# Patient Record
Sex: MALE | Race: WHITE | URBAN - NONMETROPOLITAN AREA
[De-identification: names, ages, dates, MRNs, and addresses within clinical notes are randomized per-mention and may not be internally consistent; named-entity substitution may affect disease eponyms.]

---

## 2017-01-02 ENCOUNTER — HOSPITAL ENCOUNTER (EMERGENCY)
Facility: HOSPITAL | Age: 47
Discharge: HOME | End: 2017-01-02
Attending: NURSE PRACTITIONER | Admitting: NURSE PRACTITIONER
Payer: COMMERCIAL

## 2017-01-02 VITALS
TEMPERATURE: 98.2 F | RESPIRATION RATE: 18 BRPM | HEART RATE: 123 BPM | DIASTOLIC BLOOD PRESSURE: 74 MMHG | SYSTOLIC BLOOD PRESSURE: 102 MMHG

## 2017-01-02 DIAGNOSIS — S60.221A CONTUSION OF RIGHT HAND, INITIAL ENCOUNTER: ICD-10-CM

## 2017-01-02 PROCEDURE — 99213 OFFICE O/P EST LOW 20 MIN: CPT

## 2017-01-02 PROCEDURE — 73130 X-RAY EXAM OF HAND: CPT | Mod: TC,RT

## 2017-01-02 PROCEDURE — 99213 OFFICE O/P EST LOW 20 MIN: CPT | Performed by: NURSE PRACTITIONER

## 2017-01-02 NOTE — DISCHARGE INSTRUCTIONS
Elevate right hand as much as able.   Apply ice to right hand for 20 minutes every 1-2 hours. Protect skin from first bite.   Wear splint to right hand.   You can take tylenol and or ibuprofen for pain.   Follow up with PCP in 10 days for re-evaluation.   Return to urgent care and emergency department for an increase in symptoms or concerns.

## 2017-01-02 NOTE — ED PROVIDER NOTES
History     Chief Complaint   Patient presents with     Hand Pain     right hand, dropped a piece of wood on it, swollen, painful     The history is provided by the patient. No  was used.     Gm Calderón is a 46 year old male who presents with right hand pain. He was loading plywood in a trailer today with a friend. His friend let go of a piece of plywood to soon and it struck his right hand as a result approx 2 hours ago. He placed ice to his hand and took ibuprofen with moderate relief. Denies further injuries. Denies fever, chills, or night sweats. Eating and drinking well. Bowel and bladder are working well.     I have reviewed the Medications, Allergies, Past Medical and Surgical History, and Social History in the Epic system.    Review of Systems   Constitutional: Positive for activity change. Negative for fever, chills, appetite change and fatigue.   Gastrointestinal: Negative for nausea and vomiting.   Genitourinary: Negative for dysuria.   Musculoskeletal: Negative for joint swelling.        Right hand pain.    Skin: Negative for rash and wound.   Psychiatric/Behavioral: Negative.        Physical Exam   BP: 102/74 mmHg  Pulse: (!) 123 (pt reports he has a high pulse)  Temp: 98.2  F (36.8  C)  Resp: 18  Physical Exam   Constitutional: He is oriented to person, place, and time. He appears well-developed and well-nourished. No distress.   HENT:   Mouth/Throat: Oropharynx is clear and moist.   Cardiovascular: Normal rate, regular rhythm, normal heart sounds and intact distal pulses.    Pulmonary/Chest: Effort normal. No respiratory distress. He has no wheezes. He has no rales.   Abdominal: Soft.   Musculoskeletal: He exhibits edema and tenderness.   Swelling and tenderness to right dorsal side of hand along metacarpals. Right radial pulse +2. CMS and ROM intact to right hand. Flexion and extension intact to right hand.    Neurological: He is alert and oriented to person, place, and  time.   Skin: Skin is warm and dry. He is not diaphoretic.   Psychiatric: He has a normal mood and affect. His behavior is normal.   Nursing note and vitals reviewed.      ED Course   Procedures  I personally reviewed the x-rays and there is NO fracture or dislocation. Radiology review pending and nurse will notify patient if there is any change in the treatment plan.    Results for orders placed or performed during the hospital encounter of 01/02/17   XR Hand Right G/E 3 Views    Narrative    RIGHT HAND    FINDINGS:  Three views of the right hand were obtained.    No acute fracture or dislocation is seen.  There is no focal osseous  lesion.    There is mild degenerative change in a few DIP joints.  Exam Date: Jan 02, 2017 03:30:13 PM  Author: KALEE GIPSON  This report is final and signed           Assessments & Plan (with Medical Decision Making)     Discussed findings from XRAY. Splint to right hand and follow up with PCP in 10 days for re-evaluation. He will follow up sooner if there is an increase in symptoms or concerns. He verbalized understanding.     I have reviewed the nursing notes.    I have reviewed the findings, diagnosis, plan and need for follow up with the patient.  Discharged in stable condition.     New Prescriptions    No medications on file       Final diagnoses:   Contusion of right hand, initial encounter     Elevate right hand as much as able.   Apply ice to right hand for 20 minutes every 1-2 hours. Protect skin from first bite.   Wear splint to right hand.   You can take tylenol and or ibuprofen for pain.   Follow up with PCP in 10 days for re-evaluation.   Return to urgent care and emergency department for an increase in symptoms or concerns.     RYAN Agee  1/2/2017  3:43 PM  [facility]ARE CLINIC        Irma Hunt NP  01/05/17 0944

## 2017-01-02 NOTE — ED AVS SNAPSHOT
[facility]    [address] [address]    Phone:  [phone]                                       Gm Calderón   MRN: [id number]    Department:  [facility]   Date of Visit:  1/2/2017           Patient Information     Date Of Birth          1970        Your diagnoses for this visit were:     Contusion of right hand, initial encounter        You were seen by Irma Hunt NP.      Follow-up Information     Follow up with Nicole Lance NP In 10 days.    Specialty:  Family Practice    Why:  For re-evaluation.     Contact information:    [facility]  [address] [place] [address] [phone]          Follow up with [facility].    Specialty:  EMERGENCY MEDICINE    Why:  As needed, If symptoms worsen    Contact information:    [address] [address] [phone]    Additional information:    From [facility]: Take [address]. Turn left at [address]. Turn left at the first stoplight on [address]. At the first stop sign, take a right onto [address]. Take a left into the parking lot and continue through until you reach the North enterance of the building.       From [place]: Take [address]. Take the [address] ramp towards [place]. Turn left onto [address]. Take a slight right onto [address]. Turn left at the first stoplight on [address]. At the first stop sign, take a right onto [address]. Take a left into the parking lot and continue through until you reach the North enterance of the building.       From [place]: Take [address]. Take a right at [address]. At the first stop sign, take a right onto [address]. Take a left into the parking lot and continue through until you reach the North enterance of the building.         Discharge Instructions       Elevate right hand as much as able.  "  Apply ice to right hand for 20 minutes every 1-2 hours. Protect skin from first bite.   Wear splint to right hand.   You can take tylenol and or ibuprofen for pain.   Follow up with PCP in 10 days for re-evaluation.   Return to urgent care and emergency department for an increase in symptoms or concerns.       Discharge References/Attachments     HAND CONTUSION (ENGLISH)         Review of your medicines      Our records show that you are taking the medicines listed below. If these are incorrect, please call your family doctor or clinic.        Dose / Directions Last dose taken    gabapentin 300 MG capsule   Commonly known as:  NEURONTIN   Dose:  300 mg   Quantity:  90 capsule        Take 1 capsule (300 mg) by mouth 3 times daily   Refills:  3        IBUPROFEN PO   Dose:  400 mg        Take 400 mg by mouth every 6 hours as needed for moderate pain   Refills:  0                Procedures and tests performed during your visit     XR Hand Right G/E 3 Views      Orders Needing Specimen Collection     None      Pending Results     Date and Time Order Name Status Description    1/2/2017 1518 XR Hand Right G/E 3 Views In process             Thank you for choosing [facility]       Thank you for choosing [facility] for your care. Our goal is always to provide you with excellent care. Hearing back from our patients is one way we can continue to improve our services. Please take a few minutes to complete the written survey that you may receive in the mail after you visit with us. Thank you!        [employer] Information     [employer] lets you send messages to your doctor, view your test results, renew your prescriptions, schedule appointments and more. To sign up, go to [email or web address] . Click on \"Log in\" on the left side of the screen, which will take you to the Welcome page. Then click on \"Sign up Now\" on the right side of the page.     You will be asked to enter the access code listed below, as well as some personal " information. Please follow the directions to create your username and password.     Your access code is: [email or web address]  Expires: 1/3/2017 11:35 AM     Your access code will  in 90 days. If you need help or a new code, please call your [facility] or [phone].        After Visit Summary       This is your record. Keep this with you and show to your community pharmacist(s) and doctor(s) at your next visit.

## 2017-01-02 NOTE — ED AVS SNAPSHOT
[facility]    [address]    [place] [address]    Phone:  [phone]                                       Gm Calderón   MRN: [id number]    Department:  [facility]   Date of Visit:  1/2/2017           After Visit Summary Signature Page     I have received my discharge instructions, and my questions have been answered. I have discussed any challenges I see with this plan with the nurse or doctor.    ..........................................................................................................................................  Patient/Patient Representative Signature      ..........................................................................................................................................  Patient Representative Print Name and Relationship to Patient    ..................................................               ................................................  Date                                            Time    ..........................................................................................................................................  Reviewed by Signature/Title    ...................................................              ..............................................  Date                                                            Time

## 2017-01-05 ASSESSMENT — ENCOUNTER SYMPTOMS
VOMITING: 0
WOUND: 0
APPETITE CHANGE: 0
CHILLS: 0
FEVER: 0
PSYCHIATRIC NEGATIVE: 1
DYSURIA: 0
FATIGUE: 0
NAUSEA: 0
JOINT SWELLING: 0
ACTIVITY CHANGE: 1

## 2017-01-19 ENCOUNTER — HOSPITAL ENCOUNTER (EMERGENCY)
Facility: HOSPITAL | Age: 47
Discharge: HOME | End: 2017-01-19
Attending: NURSE PRACTITIONER | Admitting: NURSE PRACTITIONER
Payer: COMMERCIAL

## 2017-01-19 VITALS
DIASTOLIC BLOOD PRESSURE: 88 MMHG | TEMPERATURE: 98.3 F | OXYGEN SATURATION: 99 % | SYSTOLIC BLOOD PRESSURE: 132 MMHG | HEART RATE: 99 BPM | RESPIRATION RATE: 18 BRPM

## 2017-01-19 DIAGNOSIS — S76.211A GROIN STRAIN, RIGHT, INITIAL ENCOUNTER: ICD-10-CM

## 2017-01-19 PROCEDURE — 96372 THER/PROPH/DIAG INJ SC/IM: CPT

## 2017-01-19 PROCEDURE — 76705 ECHO EXAM OF ABDOMEN: CPT | Mod: TC

## 2017-01-19 PROCEDURE — 99213 OFFICE O/P EST LOW 20 MIN: CPT | Performed by: NURSE PRACTITIONER

## 2017-01-19 PROCEDURE — 25000125 ZZHC RX 250: Performed by: NURSE PRACTITIONER

## 2017-01-19 PROCEDURE — 99214 OFFICE O/P EST MOD 30 MIN: CPT | Mod: 25

## 2017-01-19 RX ORDER — KETOROLAC TROMETHAMINE 30 MG/ML
30 INJECTION, SOLUTION INTRAMUSCULAR; INTRAVENOUS ONCE
Status: COMPLETED | OUTPATIENT
Start: 2017-01-19 | End: 2017-01-19

## 2017-01-19 RX ADMIN — KETOROLAC TROMETHAMINE 30 MG: 30 INJECTION, SOLUTION INTRAMUSCULAR; INTRAVENOUS at 16:09

## 2017-01-19 ASSESSMENT — ENCOUNTER SYMPTOMS
PSYCHIATRIC NEGATIVE: 1
DIARRHEA: 0
DYSURIA: 0
DIFFICULTY URINATING: 0
VOMITING: 0
FLANK PAIN: 0
APPETITE CHANGE: 0
ACTIVITY CHANGE: 0
CHILLS: 0
NAUSEA: 0
HEMATURIA: 0
FEVER: 0

## 2017-01-19 NOTE — ED AVS SNAPSHOT
[facility]    [address]    [place] [address]    Phone:  [phone]                                       Gm Calderón   MRN: [id number]    Department[facility]   Date of Visit:  1/19/2017           After Visit Summary Signature Page     I have received my discharge instructions, and my questions have been answered. I have discussed any challenges I see with this plan with the nurse or doctor.    ..........................................................................................................................................  Patient/Patient Representative Signature      ..........................................................................................................................................  Patient Representative Print Name and Relationship to Patient    ..................................................               ................................................  Date                                            Time    ..........................................................................................................................................  Reviewed by Signature/Title    ...................................................              ..............................................  Date                                                            Time

## 2017-01-19 NOTE — ED AVS SNAPSHOT
[facility]    [address] [address]    Phone:  [phone]                                       Gm Calderón   MRN: [id number]    Department[facility]  [facility]   Date of Visit:  1/19/2017           Patient Information     Date Of Birth          1970        Your diagnoses for this visit were:     Groin strain, right, initial encounter        You were seen by Irma Hunt NP.      Follow-up Information     Follow up with Nicole Lance NP In 1 week.    Specialty:  Family Practice    Why:  For re-evaluation.     Contact information:    [facility]  [address] [place] [address] [phone]          Follow up with [facility].    Specialty:  EMERGENCY MEDICINE    Why:  As needed, If symptoms worsen    Contact information:    [address] [address] [phone]    Additional information:    From [facility]: Take [address]. Turn left at [address]. Turn left at the first stoplight on [address]. At the first stop sign, take a right onto [address]. Take a left into the parking lot and continue through until you reach the North enterance of the building.       From [place]: Take [address]. Take the [address] ramp towards [place]. Turn left onto [address]. Take a slight right onto [address]. Turn left at the first stoplight on [address]. At the first stop sign, take a right onto [address]. Take a left into the parking lot and continue through until you reach the North enterance of the building.       From [place]: Take [address]. Take a right at [address]. At the first stop sign, take a right onto [address]. Take a left into the parking lot and continue through until you reach the North enterance of the building.         Discharge Instructions       You can take tylenol and or ibuprofen for  "pain.   Take it easy for activity until pain has subsided.   Follow up with PCP in 1 week for re-evaluation. Sooner with an increase in symptoms.  Return to urgent care or emergency department with any increase in symptoms or concerns.          Review of your medicines      Our records show that you are taking the medicines listed below. If these are incorrect, please call your family doctor or clinic.        Dose / Directions Last dose taken    gabapentin 300 MG capsule   Commonly known as:  NEURONTIN   Dose:  300 mg   Quantity:  90 capsule        Take 1 capsule (300 mg) by mouth 3 times daily   Refills:  3        IBUPROFEN PO   Dose:  400 mg        Take 400 mg by mouth every 6 hours as needed for moderate pain   Refills:  0                Procedures and tests performed during your visit     Abdomen US, limited (RUQ only)      Orders Needing Specimen Collection     None      Pending Results     Date and Time Order Name Status Description    1/19/2017 1608 Abdomen US, limited (RUQ only) In process             Pending Culture Results     No orders found from 1/18/2017 to 1/20/2017.            Thank you for choosing [facility]       Thank you for choosing [facility] for your care. Our goal is always to provide you with excellent care. Hearing back from our patients is one way we can continue to improve our services. Please take a few minutes to complete the written survey that you may receive in the mail after you visit with us. Thank you!        [employer] Information     [employer] lets you send messages to your doctor, view your test results, renew your prescriptions, schedule appointments and more. To sign up, go to [email or web address] . Click on \"Log in\" on the left side of the screen, which will take you to the Welcome page. Then click on \"Sign up Now\" on the right side of the page.     You will be asked to enter the access code listed below, as well as some personal information. Please follow the directions to " create your username and password.     Your access code is: [email or web address]  Expires: 2017  4:48 PM     Your access code will  in 90 days. If you need help or a new code, please call your [facility] clinic or [phone].        Care EveryWhere ID     This is your Care EveryWhere ID. This could be used by other organizations to access your [facility] medical records  [id number]        After Visit Summary       This is your record. Keep this with you and show to your community pharmacist(s) and doctor(s) at your next visit.

## 2017-01-19 NOTE — ED PROVIDER NOTES
History     Chief Complaint   Patient presents with     Hernia     The history is provided by the patient. No  was used.     Gm Calderón is a 46 year old male who presents with right inguinal pain. Pain started 3-4 days ago. History of right inguinal hernia repair in July 2016. Denies heavy lifting, trauma, or injury. He has taken ibuprofen that was mildly effective. Denies fever, chills, or night sweats. Eating and drinking well. Bowel and bladder are working well. Denies injury or trauma.     I have reviewed the Medications, Allergies, Past Medical and Surgical History, and Social History in the Epic system.    Review of Systems   Constitutional: Negative for fever, chills, activity change and appetite change.   Gastrointestinal: Negative for nausea, vomiting and diarrhea.        Right inguinal hernia pain.    Genitourinary: Negative for dysuria, hematuria, flank pain, decreased urine volume, discharge, penile swelling, scrotal swelling, difficulty urinating, genital sores, penile pain and testicular pain.   Skin: Negative for rash.   Psychiatric/Behavioral: Negative.        Physical Exam   BP: 132/88 mmHg  Pulse: 99  Temp: 98.3  F (36.8  C)  Resp: 18  SpO2: 99 %  Physical Exam   Constitutional: He is oriented to person, place, and time. He appears well-developed and well-nourished. No distress.   HENT:   Mouth/Throat: Oropharynx is clear and moist.   Cardiovascular: Normal rate, regular rhythm, normal heart sounds and intact distal pulses.    Pulmonary/Chest: Effort normal. No respiratory distress.   Abdominal: Soft. He exhibits no distension.   No bulging, erythema, bruising or warmth to the touch to right inguinal hernia region.    Genitourinary: Penis normal. No penile tenderness.   Musculoskeletal: Normal range of motion.   Neurological: He is alert and oriented to person, place, and time.   Skin: Skin is warm and dry. He is not diaphoretic.   Psychiatric: He has a normal mood and  affect. His behavior is normal.   Nursing note and vitals reviewed.      ED Course   Procedures  Results for orders placed or performed during the hospital encounter of 01/19/17   Abdomen US, limited (RUQ only)    Narrative    ULTRASOUND OF RIGHT LOWER QUADRANT    CLINICAL HISTORY:  Prior history of hernia repair.  Evaluate for  recurrent hernia.    FINDINGS:  Skin and subcutaneous fat and deeper soft tissues are  normal.  There is no distinct evidence of recurrent hernia.    IMPRESSION:  NO ULTRASOUND EVIDENCE THAT WOULD SUGGEST RECURRENT  HERNIA.  Exam Date: Jan 19, 2017 04:48:28 PM  Author: NIDIA FLOR  This report is final and signed         Assessments & Plan (with Medical Decision Making)     Discussed plan of care. Pain is a groin strain or scar tissue from previous hernia operation. He verbalized understanding to plan of care. He will follow up with PCP in 1 week or sooner with an increase in symptoms or concerns.    I have reviewed the nursing notes.    I have reviewed the findings, diagnosis, plan and need for follow up with the patient.  Discharged in stable condition.     New Prescriptions    No medications on file       Final diagnoses:   Groin strain, right, initial encounter     You can take tylenol and or ibuprofen for pain.   Take it easy for activity until pain has subsided.   Follow up with PCP in 1 week for re-evaluation. Sooner with an increase in symptoms.  Return to urgent care or emergency department with any increase in symptoms or concerns.     RYAN Agee  1/19/2017  3:53 PM  URGENT CARE CLINIC        Irma Hunt NP  01/22/17 [id number]

## 2017-01-19 NOTE — DISCHARGE INSTRUCTIONS
You can take tylenol and or ibuprofen for pain.   Take it easy for activity until pain has subsided.   Follow up with PCP in 1 week for re-evaluation. Sooner with an increase in symptoms.  Return to urgent care or emergency department with any increase in symptoms or concerns.

## 2017-02-25 ENCOUNTER — HISTORY (OUTPATIENT)
Dept: EMERGENCY MEDICINE | Facility: OTHER | Age: 47
End: 2017-02-25

## 2017-04-14 ENCOUNTER — HOSPITAL ENCOUNTER (OUTPATIENT)
Dept: RADIOLOGY | Facility: OTHER | Age: 47
End: 2017-04-14
Attending: FAMILY MEDICINE

## 2017-04-14 ENCOUNTER — OFFICE VISIT - GICH (OUTPATIENT)
Dept: FAMILY MEDICINE | Facility: OTHER | Age: 47
End: 2017-04-14

## 2017-04-14 ENCOUNTER — HISTORY (OUTPATIENT)
Dept: FAMILY MEDICINE | Facility: OTHER | Age: 47
End: 2017-04-14

## 2017-04-14 DIAGNOSIS — G89.29 OTHER CHRONIC PAIN: ICD-10-CM

## 2017-04-14 DIAGNOSIS — M54.50 LOW BACK PAIN: ICD-10-CM

## 2017-04-22 DIAGNOSIS — G57.00 LESION OF SCIATIC NERVE, UNSPECIFIED LATERALITY: ICD-10-CM

## 2017-04-25 RX ORDER — GABAPENTIN 300 MG/1
CAPSULE ORAL
Qty: 90 CAPSULE | Refills: 0 | Status: SHIPPED | OUTPATIENT
Start: 2017-04-25 | End: 2017-06-08

## 2017-04-25 NOTE — TELEPHONE ENCOUNTER
gabapentin      Last Written Prescription Date: 10/19/16  Last Fill Quantity: 90,  # refills: 3   Last Office Visit with G, P or [facility] prescribing provider: 12/22/16

## 2017-06-08 DIAGNOSIS — G57.00 LESION OF SCIATIC NERVE, UNSPECIFIED LATERALITY: ICD-10-CM

## 2017-06-08 RX ORDER — GABAPENTIN 300 MG/1
CAPSULE ORAL
Qty: 90 CAPSULE | Refills: 0 | Status: SHIPPED | OUTPATIENT
Start: 2017-06-08 | End: 2017-07-07

## 2017-07-07 DIAGNOSIS — G57.00 LESION OF SCIATIC NERVE, UNSPECIFIED LATERALITY: ICD-10-CM

## 2017-07-07 RX ORDER — GABAPENTIN 300 MG/1
CAPSULE ORAL
Qty: 90 CAPSULE | Refills: 0 | Status: SHIPPED | OUTPATIENT
Start: 2017-07-07 | End: 2017-09-06

## 2017-09-06 DIAGNOSIS — G57.00 LESION OF SCIATIC NERVE, UNSPECIFIED LATERALITY: ICD-10-CM

## 2017-09-06 NOTE — TELEPHONE ENCOUNTER
Gabapentin      Last Written Prescription Date: 7/7/17  Last Quantity: 90, # refills: 0  Last Office Visit with [facility], [facility] or [facility] prescribing provider: 12/22/16       No results found for: CR  No results found for: AST  No results found for: ALT  BP Readings from Last 3 Encounters:   01/19/17 132/88   01/02/17 102/74   12/22/16 112/70

## 2017-09-07 RX ORDER — GABAPENTIN 300 MG/1
CAPSULE ORAL
Qty: 90 CAPSULE | Refills: 0 | Status: SHIPPED | OUTPATIENT
Start: 2017-09-07 | End: 2017-10-10

## 2017-10-10 DIAGNOSIS — G57.00 LESION OF SCIATIC NERVE, UNSPECIFIED LATERALITY: ICD-10-CM

## 2017-10-10 NOTE — TELEPHONE ENCOUNTER
Gabapentin 300 mg       Last Written Prescription Date: 9-7-17  Last Quantity: 90, # refills: 0  [facility] Office Visit with [facility] or [facility] prescribing provider: 12-22-16       No results found for: CR  No results found for: AST  No results found for: ALT  BP Readings from Last 3 Encounters:   01/19/17 132/88   01/02/17 102/74   12/22/16 112/70

## 2017-10-12 RX ORDER — GABAPENTIN 300 MG/1
CAPSULE ORAL
Qty: 90 CAPSULE | Refills: 3 | Status: SHIPPED | OUTPATIENT
Start: 2017-10-12 | End: 2018-03-20

## 2018-01-04 NOTE — PROGRESS NOTES
Patient Information     Patient Name MRN Sex Gm Stuart [id number] Male 1970      Progress Notes by Leni Licona MD at 2017 11:00 AM     Author:  Leni Licona MD Service:  (none) Author Type:  Physician     Filed:  2017  4:51 PM Encounter Date:  2017 Status:  Signed     :  Leni Licona MD (Physician)            SUBJECTIVE:    Gm Calderón is a 47 y.o. male who presents for left low back pain.  Has had issues with it since last summer when he helped push a car out of the ditch.  He had an injection which helped for a while, but got worse again.  Radiates to the back of his back mid thigh.  No bowel or bladder incontinence.  No saddle anesthesia.  No foot drop or weakness.  No left lower extremity numbness.    HPI  I personally reviewed medications/allergies/history listed below:      Allergies     Allergen  Reactions     Codeine Rash   , No family history on file.,   Current Outpatient Prescriptions on File Prior to Visit       Medication  Sig Dispense Refill     acetaminophen (TYLENOL) 325 mg tablet Take 650 mg by mouth every 4 hours if needed. Max acetaminophen dose: 4000mg in 24 hrs.       HYDROcodone-acetaminophen, 5-325 mg, (NORCO) per tablet Take 1 tablet by mouth every 4 hours if needed  for Pain Max acetaminophen dose: 4000 mg in 24 hrs. 20 tablet 0     ibuprofen (ADVIL; MOTRIN) 600 mg tablet Take 1 tablet by mouth 4 times daily if needed for Pain. Maximum of 3200 mg in 24 hours. 60 tablet 0     No current facility-administered medications on file prior to visit.    , No past medical history on file., There is no problem list on file for this patient.   and No past surgical history on file.  Social History     Social History        Marital status:  Single     Spouse name: N/A     Number of children:  N/A     Years of education:  N/A     Occupational History      Not on file.     Social History Main Topics          Smoking status:   Current  "Every Day Smoker      Packs/day:  0.50      Smokeless tobacco:   Never Used       Comment: pretty close       Alcohol use   Yes      Comment: social       Drug use:   No      Sexual activity:   Not on file      Other Topics  Concern     Not on file      Social History Narrative      REVIEW OF SYSTEMS:  Review of Systems   All other systems reviewed and are negative.      OBJECTIVE:  /88  Pulse 92  Ht 1.854 m (6' 1\")  Wt 80.1 kg (176 lb 9.6 oz)  BMI 23.3 kg/m2    EXAM:   Physical Exam   Constitutional: He is well-developed, well-nourished, and in no distress.   HENT:   Head: Normocephalic.   Eyes: Pupils are equal, round, and reactive to light.   Cardiovascular: Normal rate, regular rhythm and normal heart sounds.    No murmur heard.  Pulmonary/Chest: Effort normal. No respiratory distress. He has wheezes (Scattered wheezes.). He has no rales.   Musculoskeletal: Normal range of motion.   No spinous process tenderness in lumbar spine.  Has left paraspinous and SI joint tenderness noted.  Normal strength with flexion of hips, flexions/extension at knees and dorsi/plantar flexion of ankles.     Neurological:   DTRs are 2+ and symmetric at the knees and achilles.   Psychiatric: Affect normal.   PHQ Depression Screen  Date of PHQ exam: 04/14/17  Over the last 2 weeks, how often have you been bothered by any of the following problems?  1. Little interest or pleasure in doing things: 0 - Not at all  2. Feeling down, depressed, or hopeless: 0 - Not at all            I personally reviewed results with patient as listed below:     Exam:XR SPINE LUMBAR 3 VIEWS     History: Chronic right-sided low back pain without sciatica     Findings: There is mild rotoscoliosis of the lumbar spine convex to the left. Seen on the lateral view is relative straightening of the lumbar lordosis. The disc space heights appear well maintained. There is no evidence for fracture or subluxation. No definite sacroiliac joint space narrowings " are seen. Impression: Mild spinal curvature, otherwise negative.     Electronically Signed By: Bebeto Damian M.D. on 4/14/2017 12:32 PM    ASSESSMENT/PLAN:    ICD-10-CM    1. Chronic right-sided low back pain without sciatica M54.5 XR SPINE LUMBAR 3 VIEWS     G89.29 AMB CONSULT TO PHYSICAL THERAPY      tiZANidine (ZANAFLEX) 4 mg capsule      nabumetone (RELAFEN) 500 mg tablet        Plan:    1.  Some scoliosis seen on x-ray, but no other concerning findings.  Referred to Physical Therapy in [place] where he lives.  Prescription for zanaflex given as well as relafen to use for 2 weeks.  He did request tramadol, which I declined.  [facility] website review shows that he has received numerous controlled substances from numerous providers.  Leni Licona MD ....................  4/14/2017   4:51 PM

## 2018-01-04 NOTE — NURSING NOTE
Patient Information     Patient Name MRN Sex Gm Stuart [id number] Male 1970      Nursing Note by Greta Reeves at 2017 11:00 AM     Author:  Greta Reeves Service:  (none) Author Type:  (none)     Filed:  2017 11:24 AM Encounter Date:  2017 Status:  Signed     :  Greta Reeves            Has had back pain in the past and had an injection in [place] for it. Now for the past 3-4 weeks he has had increased back pain.  Greta Reeves LPN..................2017   11:24 AM

## 2018-01-25 VITALS
WEIGHT: 176.6 LBS | DIASTOLIC BLOOD PRESSURE: 88 MMHG | HEIGHT: 73 IN | SYSTOLIC BLOOD PRESSURE: 134 MMHG | BODY MASS INDEX: 23.4 KG/M2 | HEART RATE: 92 BPM

## 2018-02-15 ENCOUNTER — OFFICE VISIT (OUTPATIENT)
Dept: FAMILY MEDICINE | Facility: OTHER | Age: 48
End: 2018-02-15
Attending: NURSE PRACTITIONER
Payer: COMMERCIAL

## 2018-02-15 VITALS
WEIGHT: 160 LBS | DIASTOLIC BLOOD PRESSURE: 72 MMHG | OXYGEN SATURATION: 97 % | BODY MASS INDEX: 21.2 KG/M2 | SYSTOLIC BLOOD PRESSURE: 110 MMHG | HEIGHT: 73 IN | HEART RATE: 115 BPM | RESPIRATION RATE: 18 BRPM | TEMPERATURE: 98.1 F

## 2018-02-15 DIAGNOSIS — Z72.0 TOBACCO ABUSE: ICD-10-CM

## 2018-02-15 DIAGNOSIS — S41.111A LACERATION OF RIGHT UPPER ARM WITHOUT COMPLICATION, INITIAL ENCOUNTER: Primary | ICD-10-CM

## 2018-02-15 DIAGNOSIS — Z71.6 TOBACCO ABUSE COUNSELING: ICD-10-CM

## 2018-02-15 PROCEDURE — G0463 HOSPITAL OUTPT CLINIC VISIT: HCPCS

## 2018-02-15 PROCEDURE — 99213 OFFICE O/P EST LOW 20 MIN: CPT | Performed by: NURSE PRACTITIONER

## 2018-02-15 ASSESSMENT — ANXIETY QUESTIONNAIRES
7. FEELING AFRAID AS IF SOMETHING AWFUL MIGHT HAPPEN: NOT AT ALL
5. BEING SO RESTLESS THAT IT IS HARD TO SIT STILL: MORE THAN HALF THE DAYS
GAD7 TOTAL SCORE: 8
IF YOU CHECKED OFF ANY PROBLEMS ON THIS QUESTIONNAIRE, HOW DIFFICULT HAVE THESE PROBLEMS MADE IT FOR YOU TO DO YOUR WORK, TAKE CARE OF THINGS AT HOME, OR GET ALONG WITH OTHER PEOPLE: SOMEWHAT DIFFICULT
6. BECOMING EASILY ANNOYED OR IRRITABLE: NOT AT ALL
1. FEELING NERVOUS, ANXIOUS, OR ON EDGE: SEVERAL DAYS
3. WORRYING TOO MUCH ABOUT DIFFERENT THINGS: MORE THAN HALF THE DAYS
4. TROUBLE RELAXING: SEVERAL DAYS
2. NOT BEING ABLE TO STOP OR CONTROL WORRYING: MORE THAN HALF THE DAYS

## 2018-02-15 ASSESSMENT — PAIN SCALES - GENERAL: PAINLEVEL: SEVERE PAIN (7)

## 2018-02-15 NOTE — PROGRESS NOTES
SUBJECTIVE:   Gm Calderón is a 48 year old male who presents to clinic today for the following health issues:  Laceration      Concern - Laceration on right forearm   Onset: This morning    Description:   130cm long, superficial    Intensity: moderate    Progression of Symptoms:  same    Accompanying Signs & Symptoms:  pain    Previous history of similar problem:   no    Precipitating factors:   Worsened by: none    Alleviating factors:  Improved by: none    Therapies Tried and outcome: washing at home    He is asking for pain medications.       Problem list and histories reviewed & adjusted, as indicated.  Additional history: as documented    Patient Active Problem List   Diagnosis     Suicide attempt     ACP (advance care planning)     Chronic left-sided low back pain with left-sided sciatica     Tobacco dependence     Anxiety     Major depressive disorder, recurrent episode, moderate (H)     Facet degeneration of lumbar region     No past surgical history on file.    Social History   Substance Use Topics     Smoking status: Current Every Day Smoker     Packs/day: 0.50     Smokeless tobacco: Never Used      Comment: Quit smoking: pretty close     Alcohol use Yes      Comment: Alcoholic Drinks/day: social     No family history on file.      Current Outpatient Prescriptions   Medication Sig Dispense Refill     IBUPROFEN PO Take 400 mg by mouth every 6 hours as needed for moderate pain       gabapentin (NEURONTIN) 300 MG capsule TAKE 1 CAPSULE BY MOUTH THREE TIMES DAILY 90 capsule 3     Allergies   Allergen Reactions     Acetaminophen      Tylenol-Codeine     Codeine Phosphate      Tylenol-Codeine     No lab results found.   BP Readings from Last 3 Encounters:   02/15/18 110/72   04/14/17 134/88   01/19/17 132/88    Wt Readings from Last 3 Encounters:   02/15/18 160 lb (72.6 kg)   04/14/17 176 lb 9.6 oz (80.1 kg)   12/22/16 185 lb (83.9 kg)                    Reviewed and updated as needed this visit by  "clinical staff  Tobacco  Allergies       Reviewed and updated as needed this visit by Provider         ROS:  Constitutional, HEENT, cardiovascular, pulmonary, gi and gu systems are negative, except as otherwise noted.    OBJECTIVE:     /72 (BP Location: Left arm, Patient Position: Chair, Cuff Size: Adult Large)  Pulse 115  Temp 98.1  F (36.7  C) (Tympanic)  Resp 18  Ht 6' 1\" (1.854 m)  Wt 160 lb (72.6 kg)  SpO2 97%  BMI 21.11 kg/m2  Body mass index is 21.11 kg/(m^2).  GENERAL: alert and no distress, disheveled   MS: no gross musculoskeletal defects noted, no edema  SKIN: right inner surface of forearm - it almost appears self inflicted.  There is a linear \"scratch\" with deepest just proximal to the wrist ending just distal from antecubital fossa.  The entire lesion is superficial, no active bleeding noted with regular edges.  PSYCH: mentation appears normal, affect flat, denies thought or plan of self harm.          ASSESSMENT/PLAN:       1. Tobacco abuse  Cessation encouraged  - Tobacco Cessation - Order to Satisfy Health Maintenance    2. Tobacco abuse counseling  As above    3. Laceration of right upper arm without complication, initial encounter  Very superficial, sutures or dermabond are not indicated.    Tetanus is up to date  Keep clean and dry and covered  May use over the counter antibiotic ointment   Ibuprofen for pain per package directions.   Again denies any thought or action of self harm.  Is again asking for stronger pain medications  This is denied.  Reviewed use of ibuprofen alternating with tylenol.  He does have tylenol on allergy list, feels it is mostly related to codeine as he has safely used tylenol in the past.       FUTURE APPOINTMENTS:       - Follow-up visit as needed or if no improvement     Nicole Lance, NP  [facility]  "

## 2018-02-15 NOTE — PATIENT INSTRUCTIONS
HOW TO QUIT SMOKING  Smoking is one of the hardest habits to break. About half of all those who have ever smoked have been able to quit, and most of those (about 70%) who still smoke want to quit. Here are some of the best ways to stop smoking.     KEEP TRYING:  It takes most smokers about 8 tries before they are finally able to fully quit. So, the more often you try and fail, the better your chance of quitting the next time! So, don't give up!    GO COLD TURKEY:  Most ex-smokers quit cold turkey. Trying to cut back gradually doesn't seem to work as well, perhaps because it continues the smoking habit. Also, it is possible to fool yourself by inhaling more while smoking fewer cigarettes. This results in the same amount of nicotine in your body!    GET SUPPORT:  Support programs can make an important difference, especially for the heavy smoker. These groups offer lectures, methods to change your behavior and peer support. Call the free national Quitline for more information. 800-QUIT-NOW (442-645-9315). Low-cost or free programs are offered by many hospitals, local chapters of the American Lung Association (533-961-1043) and the American Cancer Society (532-949-6351). Support at home is important too. Non-smokers can help by offering praise and encouragement. If the smoker fails to quit, encourage them to try again!    OVER-THE-COUNTER MEDICINES:  For those who can't quit on their own, Nicotine Replacement Therapy (NRT) may make quitting much easier. Certain aids such as the nicotine patch, gum and lozenge are available without a prescription. However, it is best to use these under the guidance of your doctor. The skin patch provides a steady supply of nicotine to the body. Nicotine gum and lozenge gives temporary bursts of low levels of nicotine. Both methods take the edge off the craving for cigarettes. WARNING: If you feel symptoms of nicotine overdose, such as nausea, vomiting, dizziness, weakness, or fast  heartbeat, stop using these and see your doctor.    PRESCRIPTION MEDICINES:  After evaluating your smoking patterns and prior attempts at quitting, your doctor may offer a prescription medicine such as bupropion (Zyban, Wellbutrin), varenicline (Chantix, Champix), a niocotine inhaler or nasal spray. Each has its unique advantage and side effects which your doctor can review with you.    HEALTH BENEFITS OF QUITTING:  The benefits of quitting start right away and keep improving the longer you go without smokin minutes: blood pressure and pulse return to normal  8 hours: oxygen levels return to normal  2 days: ability to smell and taste begins to improve as damaged nerves start to regrow  2-3 weeks: circulation and lung function improves  1-9 months: decreased cough, congestion and shortness of breath; less tired  1 year: risk of heart attack decreases by half  5 years: risk of lung cancer decreases by half; risk of stroke becomes the same as a non-smoker  For information about how to quit smoking, visit the following links:  [facility] ,   Clearing the Air, Quit Smoking Today   - an online booklet. http://www.smokefree.gov/pubs/clearing_the_air.pdf  Smokefree.gov http://smokefree.gov/  QuitNet http://www.quitnet.com/    4101-7662 [employer], [address] [place] [address], [place] [address]. All rights reserved. This information is not intended as a substitute for professional medical care. Always follow your healthcare professional's instructions.    The Benefits of Living Smoke Free  What do you want to gain from quitting? Check off some reasons to quit.  Health Benefits  ___ Reduce my risk of lung cancer, heart disease, chronic lung disease  ___ Have fewer wrinkles and softer skin  ___ Improve my sense of taste and smell  ___ For pregnant women--reduce the risk of having a miscarriage, stillbirth, premature birth, or low-birth-weight baby  Personal Benefits  ___ Feel more in control of my  life  ___ Have better-smelling hair, breath, clothes, home, and car  ___ Save time by not having to take smoke breaks, buy cigarettes, or hunt for a light  ___ Have whiter teeth  Family Benefits  ___ Reduce my children s respiratory tract infections  ___ Set a good example for my children  ___ Reduce my family s cancer risk  Financial Benefits  ___ Save hundreds of dollars each year that would be spent on cigarettes  ___ Save money on medical bills  ___ Save on life, health, and car insurance premiums    Those Dollars Add Up!  Cigarettes are expensive, and getting more expensive all the time. Do you realize how much money you are spending on cigarettes per year? What is the average amount you spend on a pack of cigarettes? What is the average number of packs that you smoke per day? Using your answers to these questions, fill in this formula to help you find out:  ($ _____ per pack) ×  ( _____ number of packs per day) × (365 days) =  $ _____ yearly cost of smoking  Besides tobacco, there are other costs, including extra cleaning bills and replacement costs for clothing and furniture; medical expenses for smoking-related illnesses; and higher health, life, and car insurance premiums.    Cigars and Pipes Count Too!  Cigars and pipes are also dangerous. So are smokeless (chewing) tobacco and snuff. All of these products contain nicotine, a highly addictive substance that has harmful effects on your body. Quitting smoking means giving up all tobacco products.      3713-8179 [address], [place] [address]. All rights reserved. This information is not intended as a substitute for professional medical care. Always follow your healthcare professional's instructions.   * LACERATION (All Closures)  A laceration is a cut through the skin. This will usually require stitches (sutures) or staples if it is deep. Minor cuts may be treated with a tape closure ( Steri-Strips ) or Dermabond skin  glue.       HOME CARE:  PAIN MEDICINE: You may use acetaminophen (Tylenol) 650-1000 mg every 6 hours or ibuprofen (Motrin, Advil) 600 mg every 6-8 hours with food to control pain, if you are able to take these medicines. [NOTE: If you have chronic liver or kidney disease or ever had a stomach ulcer or GI bleeding, talk with your doctor before using these medicines.]  EXTREMITY, FACE or TRUNK WOUNDS:    Keep the wound clean and dry. If a bandage was applied and it becomes wet or dirty, replace it. Otherwise, leave it in place for the first 24 hours.    If stitches or staples were used, clean the wound daily. Protect the wound from sunlight and tanning lamps.    After removing the bandage, wash the area with soap and water. Use a wet cotton swab (Q tip) to loosen and remove any blood or crust that forms.    After cleaning, apply a thin layer of Polysporin or Bacitracin ointment. This will keep the wound clean and make it easier to remove the stitches or staples. Reapply a fresh bandage.    You may remove the bandage to shower as usual after the first 24 hours, but do not soak the area in water (no swimming) until the stitches or staples are removed.    If Steri-Strips were used, keep the area clean and dry. If it becomes wet, blot it dry with a towel. It is okay to take a brief shower, but avoid scrubbing the area.    If Dermabond skin adhesive was used, do not scratch, rub or pick at the adhesive film. Do not place tape directly over the film. Do not apply liquid, ointment or creams to the wound while the film is in place. Do not clean the wound with peroxide and do not apply ointments. Avoid activities that cause heavy sweating until the film has fallen off. Protect the wound from prolonged exposure to sunlight or tanning lamps. You may shower as usual but do not soak the wound in water (no baths or swimming). The film will fall off by itself in 5-10 days.  SCALP WOUNDS: During the first two days, you may carefully  rinse your hair in the shower to remove blood, glass or dirt particles. After two days, you may shower and shampoo your hair normally. Do not soak your scalp in the tub or go swimming until the stitches or staples have been removed.  MOUTH WOUNDS: Eat soft foods to reduce pain. If the cut is inside of your mouth, clean by rinsing after each meal and at bedtime with a mixture of equal parts water and Hydrogen Peroxide (do not swallow!). Or, you can use a cotton swab to directly apply Hydrogen Peroxide onto the cut.  After the wound is done healing, use sunscreen over the area whenever exposed for the next 6 minths to avoid a darker scar.     FOLLOW UP: Most skin wounds heal within ten days. Mouth and facial wounds heal within five days. However, even with proper treatment, a wound infection may sometimes occur. Therefore, you should check the wound daily for signs of infection listed below.  Stitches should be removed from the face within five days; stitches and staples should be removed from other parts of the body within 7-10 days. Unless you are told to come back to the emergency room, you may have your doctor or urgent care remove the stitches. If dissolving stitches were used in the mouth, these will fall out or dissolve without the need for removal. If tape closures ( Steri-Strips ) were used, remove them yourself if they have not fallen off after 7 days. If Dermabond skin glue was used, the film will fall off by itself in 5-10 days.      GET PROMPT MEDICAL ATTENTION  if any of the following occur:    Increasing pain in the wound    Redness, swelling or pus coming from the wound    Fever over 101 F (38.3 C) oral    If stitches or staples come apart or fall out or if Steri-Strips fall off before seven days    If the wound edges re-open    Bleeding not controlled by direct pressure    4267-6041 [employer]. [address] [place] [address], [place] [address]. All rights reserved. This information is not  intended as a substitute for professional medical care. Always follow your healthcare professional's instructions.  This information has been modified by your health care provider with permission from the publisher.

## 2018-02-15 NOTE — NURSING NOTE
"Gm Calderón is a 48 year old male who presents to the clinic with a laceration on the arm, lower sustained laceration 6 hours ago.  This is a non-work related and accidental injury.  Mechanism of injury: glass.    Associated symptoms: Denies numbness, weakness, or loss of function  Pain when grabbing things  Last tetanus booster within 10 years: YES    Patient Active Problem List   Diagnosis     Suicide attempt     ACP (advance care planning)     Chronic left-sided low back pain with left-sided sciatica     Tobacco dependence     Anxiety     Major depressive disorder, recurrent episode, moderate (H)     Facet degeneration of lumbar region       Social History     Social History     Marital status: Single     Spouse name: N/A     Number of children: N/A     Years of education: N/A     Occupational History     Not on file.     Social History Main Topics     Smoking status: Current Every Day Smoker     Packs/day: 0.50     Smokeless tobacco: Never Used      Comment: Quit smoking: pretty close     Alcohol use Yes      Comment: Alcoholic Drinks/day: social     Drug use: Not on file      Comment: Drug use: No     Sexual activity: Not on file     Other Topics Concern     Not on file     Social History Narrative       Current Outpatient Prescriptions   Medication Sig Dispense Refill     IBUPROFEN PO Take 400 mg by mouth every 6 hours as needed for moderate pain       gabapentin (NEURONTIN) 300 MG capsule TAKE 1 CAPSULE BY MOUTH THREE TIMES DAILY 90 capsule 3       EXAM: The patient appears today in alert distress  VITALS: Blood pressure 110/72, pulse 115, temperature 98.1  F (36.7  C), temperature source Tympanic, resp. rate 18, height 6' 1\" (1.854 m), weight 160 lb (72.6 kg), SpO2 97 %.    Size of laceration: 130 centimeters  Characteristics of the laceration: bleeding- mild, flap type and longitudinal  Tendon function intact: yes  Sensation to light touch intact: yes  Pulses intact: yes    Picture included in patient's " chart: no    Assessment:  130 centimeter laceration    PLAN:    Wound cleaned with HIBICLENS  Wound irrigated    After care instructions:  Keep wound clean and dry for the next 24-48 hours  Apply anti-bacterial ointment for 5 day  Pamela M Lechevalier LPN

## 2018-02-15 NOTE — MR AVS SNAPSHOT
After Visit Summary   2/15/2018    Gm Calderón    MRN: [id number]           Patient Information     Date Of Birth          1970        Visit Information        Provider Department      2/15/2018 2:00 PM Nicole Lance NP [facility] [place]        Today's Diagnoses     Laceration of right upper arm without complication, initial encounter    -  1    Tobacco abuse        Tobacco abuse counseling          Care Instructions      HOW TO QUIT SMOKING  Smoking is one of the hardest habits to break. About half of all those who have ever smoked have been able to quit, and most of those (about 70%) who still smoke want to quit. Here are some of the best ways to stop smoking.     KEEP TRYING:  It takes most smokers about 8 tries before they are finally able to fully quit. So, the more often you try and fail, the better your chance of quitting the next time! So, don't give up!    GO COLD TURKEY:  Most ex-smokers quit cold turkey. Trying to cut back gradually doesn't seem to work as well, perhaps because it continues the smoking habit. Also, it is possible to fool yourself by inhaling more while smoking fewer cigarettes. This results in the same amount of nicotine in your body!    GET SUPPORT:  Support programs can make an important difference, especially for the heavy smoker. These groups offer lectures, methods to change your behavior and peer support. Call the free national Quitline for more information. 800-QUIT-NOW (395-450-4171). Low-cost or free programs are offered by many hospitals, local chapters of the American Lung Association (383-501-5192) and the American Cancer Society (089-038-1264). Support at home is important too. Non-smokers can help by offering praise and encouragement. If the smoker fails to quit, encourage them to try again!    OVER-THE-COUNTER MEDICINES:  For those who can't quit on their own, Nicotine Replacement Therapy (NRT) may make quitting much easier. Certain  aids such as the nicotine patch, gum and lozenge are available without a prescription. However, it is best to use these under the guidance of your doctor. The skin patch provides a steady supply of nicotine to the body. Nicotine gum and lozenge gives temporary bursts of low levels of nicotine. Both methods take the edge off the craving for cigarettes. WARNING: If you feel symptoms of nicotine overdose, such as nausea, vomiting, dizziness, weakness, or fast heartbeat, stop using these and see your doctor.    PRESCRIPTION MEDICINES:  After evaluating your smoking patterns and prior attempts at quitting, your doctor may offer a prescription medicine such as bupropion (Zyban, Wellbutrin), varenicline (Chantix, Champix), a niocotine inhaler or nasal spray. Each has its unique advantage and side effects which your doctor can review with you.    HEALTH BENEFITS OF QUITTING:  The benefits of quitting start right away and keep improving the longer you go without smokin minutes: blood pressure and pulse return to normal  8 hours: oxygen levels return to normal  2 days: ability to smell and taste begins to improve as damaged nerves start to regrow  2-3 weeks: circulation and lung function improves  1-9 months: decreased cough, congestion and shortness of breath; less tired  1 year: risk of heart attack decreases by half  5 years: risk of lung cancer decreases by half; risk of stroke becomes the same as a non-smoker  For information about how to quit smoking, visit the following links:  National [employer] [place] ,   Clearing the Air, Quit Smoking Today   - an online booklet. http://www.smokefree.gov/pubs/clearing_the_air.pdf  Smokefree.gov http://smokefree.gov/  QuitNet http://www.quitnet.com/    7386-7635 [employer], [address] [place] [address], [place] [address]. All rights reserved. This information is not intended as a substitute for professional medical care. Always follow your healthcare professional's  instructions.    The Benefits of Living Smoke Free  What do you want to gain from quitting? Check off some reasons to quit.  Health Benefits  ___ Reduce my risk of lung cancer, heart disease, chronic lung disease  ___ Have fewer wrinkles and softer skin  ___ Improve my sense of taste and smell  ___ For pregnant women--reduce the risk of having a miscarriage, stillbirth, premature birth, or low-birth-weight baby  Personal Benefits  ___ Feel more in control of my life  ___ Have better-smelling hair, breath, clothes, home, and car  ___ Save time by not having to take smoke breaks, buy cigarettes, or hunt for a light  ___ Have whiter teeth  Family Benefits  ___ Reduce my children s respiratory tract infections  ___ Set a good example for my children  ___ Reduce my family s cancer risk  Financial Benefits  ___ Save hundreds of dollars each year that would be spent on cigarettes  ___ Save money on medical bills  ___ Save on life, health, and car insurance premiums    Those Dollars Add Up!  Cigarettes are expensive, and getting more expensive all the time. Do you realize how much money you are spending on cigarettes per year? What is the average amount you spend on a pack of cigarettes? What is the average number of packs that you smoke per day? Using your answers to these questions, fill in this formula to help you find out:  ($ _____ per pack) ×  ( _____ number of packs per day) × (365 days) =  $ _____ yearly cost of smoking  Besides tobacco, there are other costs, including extra cleaning bills and replacement costs for clothing and furniture; medical expenses for smoking-related illnesses; and higher health, life, and car insurance premiums.    Cigars and Pipes Count Too!  Cigars and pipes are also dangerous. So are smokeless (chewing) tobacco and snuff. All of these products contain nicotine, a highly addictive substance that has harmful effects on your body. Quitting smoking means giving up all tobacco products.       6456-5575 [facility], [address] [place] [address], [place] [address]. All rights reserved. This information is not intended as a substitute for professional medical care. Always follow your healthcare professional's instructions.   * LACERATION (All Closures)  A laceration is a cut through the skin. This will usually require stitches (sutures) or staples if it is deep. Minor cuts may be treated with a tape closure ( Steri-Strips ) or Dermabond skin glue.       HOME CARE:  PAIN MEDICINE: You may use acetaminophen (Tylenol) 650-1000 mg every 6 hours or ibuprofen (Motrin, Advil) 600 mg every 6-8 hours with food to control pain, if you are able to take these medicines. [NOTE: If you have chronic liver or kidney disease or ever had a stomach ulcer or GI bleeding, talk with your doctor before using these medicines.]  EXTREMITY, FACE or TRUNK WOUNDS:    Keep the wound clean and dry. If a bandage was applied and it becomes wet or dirty, replace it. Otherwise, leave it in place for the first 24 hours.    If stitches or staples were used, clean the wound daily. Protect the wound from sunlight and tanning lamps.    After removing the bandage, wash the area with soap and water. Use a wet cotton swab (Q tip) to loosen and remove any blood or crust that forms.    After cleaning, apply a thin layer of Polysporin or Bacitracin ointment. This will keep the wound clean and make it easier to remove the stitches or staples. Reapply a fresh bandage.    You may remove the bandage to shower as usual after the first 24 hours, but do not soak the area in water (no swimming) until the stitches or staples are removed.    If Steri-Strips were used, keep the area clean and dry. If it becomes wet, blot it dry with a towel. It is okay to take a brief shower, but avoid scrubbing the area.    If Dermabond skin adhesive was used, do not scratch, rub or pick at the adhesive film. Do not place tape directly over the film. Do not apply liquid, ointment  or creams to the wound while the film is in place. Do not clean the wound with peroxide and do not apply ointments. Avoid activities that cause heavy sweating until the film has fallen off. Protect the wound from prolonged exposure to sunlight or tanning lamps. You may shower as usual but do not soak the wound in water (no baths or swimming). The film will fall off by itself in 5-10 days.  SCALP WOUNDS: During the first two days, you may carefully rinse your hair in the shower to remove blood, glass or dirt particles. After two days, you may shower and shampoo your hair normally. Do not soak your scalp in the tub or go swimming until the stitches or staples have been removed.  MOUTH WOUNDS: Eat soft foods to reduce pain. If the cut is inside of your mouth, clean by rinsing after each meal and at bedtime with a mixture of equal parts water and Hydrogen Peroxide (do not swallow!). Or, you can use a cotton swab to directly apply Hydrogen Peroxide onto the cut.  After the wound is done healing, use sunscreen over the area whenever exposed for the next 6 minths to avoid a darker scar.     FOLLOW UP: Most skin wounds heal within ten days. Mouth and facial wounds heal within five days. However, even with proper treatment, a wound infection may sometimes occur. Therefore, you should check the wound daily for signs of infection listed below.  Stitches should be removed from the face within five days; stitches and staples should be removed from other parts of the body within 7-10 days. Unless you are told to come back to the emergency room, you may have your doctor or urgent care remove the stitches. If dissolving stitches were used in the mouth, these will fall out or dissolve without the need for removal. If tape closures ( Steri-Strips ) were used, remove them yourself if they have not fallen off after 7 days. If Dermabond skin glue was used, the film will fall off by itself in 5-10 days.      GET PROMPT MEDICAL ATTENTION   "if any of the following occur:    Increasing pain in the wound    Redness, swelling or pus coming from the wound    Fever over 101 F (38.3 C) oral    If stitches or staples come apart or fall out or if Steri-Strips fall off before seven days    If the wound edges re-open    Bleeding not controlled by direct pressure    8457-9809 [employer]. [address] [place] [address]. All rights reserved. This information is not intended as a substitute for professional medical care. Always follow your healthcare professional's instructions.  This information has been modified by your health care provider with permission from the publisher.            Follow-ups after your visit        Follow-up notes from your care team     Return if symptoms worsen or fail to improve.      Who to contact     If you have questions or need follow up information about today's clinic visit or your schedule please contact [facility] directly at [phone].  Normal or non-critical lab and imaging results will be communicated to you by MyChart, letter or phone within 4 business days after the clinic has received the results. If you do not hear from us within 7 days, please contact the clinic through MyChart or phone. If you have a critical or abnormal lab result, we will notify you by phone as soon as possible.  Submit refill requests through [employer] [facility] or call your pharmacy and they will forward the refill request to us. Please allow 3 business days for your refill to be completed.          Additional Information About Your Visit        [employer] Information     [employer] [facility] lets you send messages to your doctor, view your test results, renew your prescriptions, schedule appointments and more. To sign up, go to [email or web address] . Click on \"Log in\" on the left side of the screen, which will take you to the Welcome page. Then click on \"Sign up Now\" on the right side of the page.     You will be asked to enter " "the access code listed below, as well as some personal information. Please follow the directions to create your username and password.     Your access code is: [email or web address]  Expires: 2018 10:38 AM     Your access code will  in 90 days. If you need help or a new code, please call your [place] clinic or [phone].        Care EveryWhere ID     This is your Care EveryWhere ID. This could be used by other organizations to access your [facility] medical records  [id number]        Your Vitals Were     Pulse Temperature Respirations Height Pulse Oximetry BMI (Body Mass Index)    115 98.1  F (36.7  C) (Tympanic) 18 6' 1\" (1.854 m) 97% 21.11 kg/m2       Blood Pressure from Last 3 Encounters:   02/15/18 110/72   17 134/88   17 132/88    Weight from Last 3 Encounters:   02/15/18 160 lb (72.6 kg)   17 176 lb 9.6 oz (80.1 kg)   16 185 lb (83.9 kg)              We Performed the Following     Tobacco Cessation - Order to Satisfy Health Maintenance        Primary Care Provider Office Phone # Fax #    Nicole Lance [phone] [address] [place] [id number]        Equal Access to Services     CHRISTIANO SCALES : Hadii sima ziegler hadasho Soomaali, waaxda luqadaha, qaybta kaalmada adekendrada, kinga al. So [facility] [phone].    ATENCIÓN: Si habla español, tiene a gonzales disposición servicios gratuitos de asistencia lingüística. Gume al [phone].    We comply with applicable federal civil rights laws and [place] laws. We do not discriminate on the basis of race, color, national origin, age, disability, sex, sexual orientation, or gender identity.            Thank you!     Thank you for choosing [facility] [place]  for your care. Our goal is always to provide you with excellent care. Hearing back from our patients is one way we can continue to improve our services. Please take a few minutes to complete the written " survey that you may receive in the mail after your visit with us. Thank you!             Your Updated Medication List - Protect others around you: Learn how to safely use, store and throw away your medicines at www.disposemymeds.org.          This list is accurate as of 2/15/18  2:27 PM.  Always use your most recent med list.                   Brand Name Dispense Instructions for use Diagnosis    gabapentin 300 MG capsule    NEURONTIN    90 capsule    TAKE 1 CAPSULE BY MOUTH THREE TIMES DAILY    Lesion of sciatic nerve, unspecified laterality       IBUPROFEN PO      Take 400 mg by mouth every 6 hours as needed for moderate pain

## 2018-02-16 ASSESSMENT — PATIENT HEALTH QUESTIONNAIRE - PHQ9: SUM OF ALL RESPONSES TO PHQ QUESTIONS 1-9: 14

## 2018-02-16 ASSESSMENT — ANXIETY QUESTIONNAIRES: GAD7 TOTAL SCORE: 8

## 2018-03-07 ENCOUNTER — DOCUMENTATION ONLY (OUTPATIENT)
Dept: FAMILY MEDICINE | Facility: OTHER | Age: 48
End: 2018-03-07

## 2018-03-07 RX ORDER — HYDROCODONE BITARTRATE AND ACETAMINOPHEN 5; 325 MG/1; MG/1
1 TABLET ORAL EVERY 4 HOURS PRN
COMMUNITY
Start: 2017-02-25 | End: 2018-04-07

## 2018-03-07 RX ORDER — ACETAMINOPHEN 325 MG/1
650 TABLET ORAL EVERY 4 HOURS PRN
COMMUNITY
End: 2018-04-07

## 2018-03-07 RX ORDER — IBUPROFEN 600 MG/1
600 TABLET, FILM COATED ORAL EVERY 4 HOURS PRN
COMMUNITY
Start: 2017-02-25 | End: 2018-04-07

## 2018-03-07 RX ORDER — TIZANIDINE HYDROCHLORIDE 4 MG/1
4 CAPSULE, GELATIN COATED ORAL EVERY 6 HOURS PRN
COMMUNITY
Start: 2017-04-14 | End: 2018-04-07

## 2018-03-12 ENCOUNTER — DOCUMENTATION ONLY (OUTPATIENT)
Dept: FAMILY MEDICINE | Facility: OTHER | Age: 48
End: 2018-03-12

## 2018-03-20 DIAGNOSIS — G57.00 LESION OF SCIATIC NERVE, UNSPECIFIED LATERALITY: ICD-10-CM

## 2018-03-21 RX ORDER — GABAPENTIN 300 MG/1
CAPSULE ORAL
Qty: 90 CAPSULE | Refills: 1 | Status: SHIPPED | OUTPATIENT
Start: 2018-03-21 | End: 2018-05-31

## 2018-04-07 ENCOUNTER — HOSPITAL ENCOUNTER (EMERGENCY)
Facility: HOSPITAL | Age: 48
Discharge: HOME | End: 2018-04-07
Attending: PHYSICIAN ASSISTANT | Admitting: PHYSICIAN ASSISTANT
Payer: COMMERCIAL

## 2018-04-07 VITALS
TEMPERATURE: 97.3 F | SYSTOLIC BLOOD PRESSURE: 131 MMHG | OXYGEN SATURATION: 100 % | RESPIRATION RATE: 16 BRPM | DIASTOLIC BLOOD PRESSURE: 76 MMHG

## 2018-04-07 DIAGNOSIS — S46.002A ROTATOR CUFF INJURY, LEFT, INITIAL ENCOUNTER: ICD-10-CM

## 2018-04-07 PROCEDURE — G0463 HOSPITAL OUTPT CLINIC VISIT: HCPCS

## 2018-04-07 PROCEDURE — 99213 OFFICE O/P EST LOW 20 MIN: CPT | Performed by: PHYSICIAN ASSISTANT

## 2018-04-07 RX ORDER — KETOROLAC TROMETHAMINE 10 MG/1
10 TABLET, FILM COATED ORAL EVERY 6 HOURS PRN
Qty: 10 TABLET | Refills: 0 | Status: SHIPPED | OUTPATIENT
Start: 2018-04-07 | End: 2018-05-09

## 2018-04-07 ASSESSMENT — ENCOUNTER SYMPTOMS
CONSTITUTIONAL NEGATIVE: 1
CARDIOVASCULAR NEGATIVE: 1
MYALGIAS: 1
NECK PAIN: 0
PSYCHIATRIC NEGATIVE: 1
NEUROLOGICAL NEGATIVE: 1
NECK STIFFNESS: 0

## 2018-04-07 NOTE — ED PROVIDER NOTES
History     Chief Complaint   Patient presents with     Shoulder Pain     notes hurts to move lt shoulder since lifting a couch earlier today, notes previous injury to his shoulder     The history is provided by the patient. No  was used.     Gm Calderón is a 48 year old male who just hurt his left shoulder while trying to move a couch, just prior to coming to  today. Denies any fall. States he was pushing the couch when the pain started.  Pt asking for Tramadol.    Problem List:    Patient Active Problem List    Diagnosis Date Noted     Anxiety 10/19/2016     Priority: Medium     Major depressive disorder, recurrent episode, moderate (H) 10/19/2016     Priority: Medium     Facet degeneration of lumbar region 10/19/2016     Priority: Medium     Chronic left-sided low back pain with left-sided sciatica 05/25/2016     Priority: Medium     Tobacco dependence 05/25/2016     Priority: Medium     ACP (advance care planning) 05/11/2016     Priority: Medium     Advance Care Planning 5/11/2016: ACP Review of Chart / Resources Provided:  Reviewed chart for advance care plan.  Gm Calderón has no plan or code status on file. Discussed available resources and provided with information. Confirmed code status reflects current choices pending further ACP discussions.  Confirmed/documented legally designated decision makers.  Added by Mirella Kiser             Suicide attempt 07/08/2015     Priority: Medium        Past Medical History:    History reviewed. No pertinent past medical history.    Past Surgical History:    History reviewed. No pertinent surgical history.    Family History:    No family history on file.    Social History:  Marital Status:  Single [1]  Social History   Substance Use Topics     Smoking status: Current Every Day Smoker     Packs/day: 0.50     Smokeless tobacco: Never Used      Comment: Quit smoking: pretty close     Alcohol use Yes      Comment: Alcoholic Drinks/day: social         Medications:      ketorolac (TORADOL) 10 MG tablet   gabapentin (NEURONTIN) 300 MG capsule         Review of Systems   Constitutional: Negative.    Cardiovascular: Negative.    Musculoskeletal: Positive for myalgias. Negative for neck pain and neck stiffness.   Neurological: Negative.    Psychiatric/Behavioral: Negative.        Physical Exam   BP: 131/76  Heart Rate: 111  Temp: 97.3  F (36.3  C)  Resp: 16  SpO2: 100 %      Physical Exam   Constitutional: He is oriented to person, place, and time. He appears well-developed and well-nourished. No distress.   Cardiovascular:   tachycardia   Pulmonary/Chest: Effort normal.   Musculoskeletal:   Left shoulder: no e/e/e/e. M/n/v intact. Pt guarding against all elbow movement away from his body in all planes.    Neurological: He is alert and oriented to person, place, and time.   Skin: He is not diaphoretic.   Psychiatric: He has a normal mood and affect.   Nursing note and vitals reviewed.      ED Course     ED Course     Procedures  Exam: XR SHOULDER 3 VIEWS LEFT     History: SHOULDER INJURY;     Technique: 3 views.     Findings: No fracture or dislocation is seen. Acromiohumeral distance is fairly normal and there is no significant degenerative change.     There is slight elevation of the lateral clavicle relative to the acromion process and there may be a mild AC separation.           Electronically Signed By: Reyna Orozco M.D. on 2/25/2017 5:48 PM       Assessments & Plan (with Medical Decision Making)     I have reviewed the nursing notes.    I have reviewed the findings, diagnosis, plan and need for follow up with the patient.      Discharge Medication List as of 4/7/2018  4:55 PM      START taking these medications    Details   ketorolac (TORADOL) 10 MG tablet Take 1 tablet (10 mg) by mouth every 6 hours as needed for moderate pain, Disp-10 tablet, R-0, E-Prescribe             Final diagnoses:   Rotator cuff injury, left, initial encounter         Pt  again asked for Tramadol.  I declined.  Sling applied.    Patient verbally educated and given appropriate education sheets for the diagnoses and has no questions.  Take medications as directed.   Follow up with your Primary Care provider if symptoms increase or if not resolving in next 10 days.  if concerns develop, return to the ER  Kari Smith Certified  Physician Assistant  4/7/2018  5:14 PM  URGENT CARE CLINIC      4/7/2018   [facility]     Kari Smith PA  04/07/18 [id number]

## 2018-04-07 NOTE — ED AVS SNAPSHOT
[facility]    [address]    [facility] [address]    Phone:  [phone]                                       Gm Calderón   MRN: [id number]    Department:  [facility]   Date of Visit:  4/7/2018           Patient Information     Date Of Birth          1970        Your diagnoses for this visit were:     Rotator cuff injury, left, initial encounter        You were seen by Kari Smith PA.      Follow-up Information     Follow up with Nicole Lance NP.    Specialty:  Family Practice    Why:  If symptoms worsen or if not resolving in next 10 days    Contact information:    [address] [facility] [address] [phone]          Follow up with [facility].    Specialty:  EMERGENCY MEDICINE    Why:  if further concerns develop    Contact information:    [address] [address] [phone]    Additional information:    From [facility]: Take [address]. Turn left at [address]. Turn left at the first stoplight on [address]. At the first stop sign, take a right onto [address]. Take a left into the parking lot and continue through until you reach the North enterance of the building.       From [place]: Take [address]. Take the [address] ramp towards [place]. Turn left onto [address]. Take a slight right onto [address]. Turn left at the first stoplight on [address]. At the first stop sign, take a right onto [address]. Take a left into the parking lot and continue through until you reach the North enterance of the building.       From [place]: Take [address]. Take a right at [address]. At the first stop sign, take a right onto [address]. Take a left into the parking lot and continue through until you reach the North enterance of the building.       Discharge References/Attachments     ROTATOR CUFF TENDONITIS,  "UNDERSTANDING (ENGLISH)         Review of your medicines      START taking        Dose / Directions Last dose taken    ketorolac 10 MG tablet   Commonly known as:  TORADOL   Dose:  10 mg   Quantity:  10 tablet        Take 1 tablet (10 mg) by mouth every 6 hours as needed for moderate pain   Refills:  0          Our records show that you are taking the medicines listed below. If these are incorrect, please call your family doctor or clinic.        Dose / Directions Last dose taken    gabapentin 300 MG capsule   Commonly known as:  NEURONTIN   Quantity:  90 capsule        TAKE 1 CAPSULE BY MOUTH THREE TIMES DAILY   Refills:  1                Prescriptions were sent or printed at these locations (1 Prescription)                   [facility] - [place], [place] - [address] AT [facility] [address]   [address], [place] [address]    Telephone:  [phone]   Fax:  [phone]   Hours:                  E-Prescribed (1 of 1)         ketorolac (TORADOL) 10 MG tablet                Procedures and tests performed during your visit     Sling      Orders Needing Specimen Collection     None      Pending Results     No orders found from 4/5/2018 to 4/8/2018.            Pending Culture Results     No orders found from 4/5/2018 to 4/8/2018.            Thank you for choosing [facility]       Thank you for choosing [facility] for your care. Our goal is always to provide you with excellent care. Hearing back from our patients is one way we can continue to improve our services. Please take a few minutes to complete the written survey that you may receive in the mail after you visit with us. Thank you!        the grafterhart Information     Gigalocal lets you send messages to your doctor, view your test results, renew your prescriptions, schedule appointments and more. To sign up, go to [email or web address] . Click on \"Log in\" on the left side of the screen, which will take you to the Welcome page. Then click on \"Sign up " "Now\" on the right side of the page.     You will be asked to enter the access code listed below, as well as some personal information. Please follow the directions to create your username and password.     Your access code is: [email or web address]  Expires: 2018 11:38 AM     Your access code will  in 90 days. If you need help or a new code, please call your [facility] clinic or [phone].        Care EveryWhere ID     This is your Care EveryWhere ID. This could be used by other organizations to access your [facility] medical records  [id number]        Equal Access to Services     BERE [facility] : Lisa Jiang, kd horton, anatoliy patel, kinga wood . So [facility] [phone].    ATENCIÓN: Si habla español, tiene a gonzales disposición servicios gratuitos de asistencia lingüística. Llame al [phone].    We comply with applicable federal civil rights laws and [place] laws. We do not discriminate on the basis of race, color, national origin, age, disability, sex, sexual orientation, or gender identity.            After Visit Summary       This is your record. Keep this with you and show to your community pharmacist(s) and doctor(s) at your next visit.                  "

## 2018-04-07 NOTE — ED NOTES
Pt presents today with c/o left shoulder pain. Pt was moving a couch earlier today and has a hx of a previous shoulder injury

## 2018-04-07 NOTE — ED AVS SNAPSHOT
[facility]    [address]    [place] [address]    Phone:  [phone]                                       Gm Calderón   MRN: [id number]    Department:  [facility]   Date of Visit:  4/7/2018           After Visit Summary Signature Page     I have received my discharge instructions, and my questions have been answered. I have discussed any challenges I see with this plan with the nurse or doctor.    ..........................................................................................................................................  Patient/Patient Representative Signature      ..........................................................................................................................................  Patient Representative Print Name and Relationship to Patient    ..................................................               ................................................  Date                                            Time    ..........................................................................................................................................  Reviewed by Signature/Title    ...................................................              ..............................................  Date                                                            Time

## 2018-04-20 ENCOUNTER — HOSPITAL ENCOUNTER (EMERGENCY)
Facility: HOSPITAL | Age: 48
Discharge: HOME | End: 2018-04-20
Attending: PHYSICIAN ASSISTANT | Admitting: PHYSICIAN ASSISTANT
Payer: COMMERCIAL

## 2018-04-20 ENCOUNTER — APPOINTMENT (OUTPATIENT)
Dept: GENERAL RADIOLOGY | Facility: HOSPITAL | Age: 48
End: 2018-04-20
Attending: PHYSICIAN ASSISTANT
Payer: COMMERCIAL

## 2018-04-20 VITALS
TEMPERATURE: 97.6 F | SYSTOLIC BLOOD PRESSURE: 123 MMHG | HEART RATE: 108 BPM | RESPIRATION RATE: 18 BRPM | DIASTOLIC BLOOD PRESSURE: 67 MMHG | OXYGEN SATURATION: 99 %

## 2018-04-20 DIAGNOSIS — S47.1XXA CRUSHING INJURY OF RIGHT UPPER EXTREMITY, INITIAL ENCOUNTER: ICD-10-CM

## 2018-04-20 PROCEDURE — 99213 OFFICE O/P EST LOW 20 MIN: CPT | Performed by: PHYSICIAN ASSISTANT

## 2018-04-20 PROCEDURE — 73100 X-RAY EXAM OF WRIST: CPT | Mod: TC,RT

## 2018-04-20 PROCEDURE — G0463 HOSPITAL OUTPT CLINIC VISIT: HCPCS

## 2018-04-20 PROCEDURE — 25000132 ZZH RX MED GY IP 250 OP 250 PS 637: Performed by: PHYSICIAN ASSISTANT

## 2018-04-20 PROCEDURE — 73090 X-RAY EXAM OF FOREARM: CPT | Mod: TC,RT

## 2018-04-20 RX ORDER — IBUPROFEN 800 MG/1
800 TABLET, FILM COATED ORAL EVERY 8 HOURS PRN
Qty: 60 TABLET | Refills: 0 | Status: SHIPPED | OUTPATIENT
Start: 2018-04-20 | End: 2018-04-28

## 2018-04-20 RX ORDER — TRAMADOL HYDROCHLORIDE 50 MG/1
50 TABLET ORAL ONCE
Status: COMPLETED | OUTPATIENT
Start: 2018-04-20 | End: 2018-04-20

## 2018-04-20 RX ORDER — TRAMADOL HYDROCHLORIDE 50 MG/1
50-100 TABLET ORAL EVERY 6 HOURS PRN
Qty: 6 TABLET | Refills: 0 | Status: SHIPPED | OUTPATIENT
Start: 2018-04-20 | End: 2018-05-09

## 2018-04-20 RX ORDER — NAPROXEN 500 MG/1
500 TABLET ORAL ONCE
Status: COMPLETED | OUTPATIENT
Start: 2018-04-20 | End: 2018-04-20

## 2018-04-20 RX ADMIN — TRAMADOL HYDROCHLORIDE 50 MG: 50 TABLET, COATED ORAL at 20:26

## 2018-04-20 RX ADMIN — NAPROXEN 500 MG: 500 TABLET ORAL at 20:26

## 2018-04-20 NOTE — ED AVS SNAPSHOT
[facility]    [address]    [place] [address]    Phone:  [phone]                                       mG Calderón   MRN: [id number]    Department:  [facility]   Date of Visit:  4/20/2018           After Visit Summary Signature Page     I have received my discharge instructions, and my questions have been answered. I have discussed any challenges I see with this plan with the nurse or doctor.    ..........................................................................................................................................  Patient/Patient Representative Signature      ..........................................................................................................................................  Patient Representative Print Name and Relationship to Patient    ..................................................               ................................................  Date                                            Time    ..........................................................................................................................................  Reviewed by Signature/Title    ...................................................              ..............................................  Date                                                            Time

## 2018-04-20 NOTE — ED AVS SNAPSHOT
[facility]    [address] [address]    Phone:  [phone]                                       Gm Calderón   MRN: [id number]    Department:  [facility]   Date of Visit:  4/20/2018           Patient Information     Date Of Birth          1970        Your diagnoses for this visit were:     Crushing injury of right upper extremity, initial encounter        You were seen by Garcia Bentley PA.      Follow-up Information     Follow up with Nicole Lance NP In 1 week.    Specialty:  Family Practice    Contact information:    [address] [facility] [address]  [phone]          Follow up with [facility].    Specialty:  EMERGENCY MEDICINE    Why:  If symptoms worsen    Contact information:    [address] [address] [phone]    Additional information:    From [facility]: Take [address]. Turn left at [address]. Turn left at the first stoplight on [address] [place] [address]. At the first stop sign, take a right onto [address]. Take a left into the parking lot and continue through until you reach the North enterance of the building.       From [place]: Take [address]. Take the [address] ramp towards [place]. Turn left onto [address]. Take a slight right onto [address]. Turn left at the first stoplight on [address]. At the first stop sign, take a right onto [address]. Take a left into the parking lot and continue through until you reach the North enterance of the building.       From [place]: Take [address]. Take a right at [address]. At the first stop sign, take a right onto [address]. Take a left into the parking lot and continue through until you reach the North enterance of the building.         Discharge Instructions       - Rest, ice, compression, elevation  - Rotate ibuprofen and tylenol every 3-6 hrs  for 2-3 days  - ultram for pain >5/10.     * range of motion as tolerated after 3 or so days, may wear the splint for support for up to 1 week. Any pain after 7 days should be rechecked, possibly re-xrayed.     Discharge References/Attachments     CRUSH INJURY, HAND (ENGLISH)         Review of your medicines      START taking        Dose / Directions Last dose taken    ibuprofen 800 MG tablet   Commonly known as:  ADVIL/MOTRIN   Dose:  800 mg   Quantity:  60 tablet        Take 1 tablet (800 mg) by mouth every 8 hours as needed for moderate pain   Refills:  0        traMADol 50 MG tablet   Commonly known as:  ULTRAM   Dose:   mg   Quantity:  6 tablet        Take 1-2 tablets ( mg) by mouth every 6 hours as needed for pain   Refills:  0          Our records show that you are taking the medicines listed below. If these are incorrect, please call your family doctor or clinic.        Dose / Directions Last dose taken    gabapentin 300 MG capsule   Commonly known as:  NEURONTIN   Quantity:  90 capsule        TAKE 1 CAPSULE BY MOUTH THREE TIMES DAILY   Refills:  1        ketorolac 10 MG tablet   Commonly known as:  TORADOL   Dose:  10 mg   Quantity:  10 tablet        Take 1 tablet (10 mg) by mouth every 6 hours as needed for moderate pain   Refills:  0                Information about OPIOIDS     PRESCRIPTION OPIOIDS: WHAT YOU NEED TO KNOW   You have a prescription for an opioid (narcotic) pain medicine. Opioids can cause addiction. If you have a history of chemical dependency of any type, you are at a higher risk of becoming addicted to opioids. Only take this medicine after all other options have been tried. Take it for as short a time and as few doses as possible.     Do not:    Drive. If you drive while taking these medicines, you could be arrested for driving under the influence (DUI).    Operate heavy machinery    Do any other dangerous activities while taking these medicines.     Drink any alcohol while  taking these medicines.      Take with any other medicines that contain acetaminophen. Read all labels carefully. Look for the word  acetaminophen  or  Tylenol.  Ask your pharmacist if you have questions or are unsure.    Store your pills in a secure place, locked if possible. We will not replace any lost or stolen medicine. If you don t finish your medicine, please throw away (dispose) as directed by your pharmacist. The [place] Pollution Control Agency has more information about safe disposal: https://www.pca.[facility].mn.us/living-green/managing-unwanted-medications    All opioids tend to cause constipation. Drink plenty of water and eat foods that have a lot of fiber, such as fruits, vegetables, prune juice, apple juice and high-fiber cereal. Take a laxative (Miralax, milk of magnesia, Colace, Senna) if you don t move your bowels at least every other day.         Prescriptions were sent or printed at these locations (2 Prescriptions)                   [facility] [address] [place] [address] [address] AT [facility] [address], [facility] [address]    Telephone:  [phone]   Fax:  [phone]   Hours:                  E-Prescribed (1 of 2)         ibuprofen (ADVIL/MOTRIN) 800 MG tablet                 Printed at Department/Unit printer (1 of 2)         traMADol (ULTRAM) 50 MG tablet                Procedures and tests performed during your visit     XR Forearm Right 2 Views    XR Wrist Right 2 Views      Orders Needing Specimen Collection     None      Pending Results     No orders found from 4/18/2018 to 4/21/2018.            Pending Culture Results     No orders found from 4/18/2018 to 4/21/2018.            Thank you for choosing [facility]       Thank you for choosing [facility] for your care. Our goal is always to provide you with excellent care. Hearing back from our patients is one way we can continue to improve our services. Please take a few minutes to complete the written survey  "that you may receive in the mail after you visit with us. Thank you!        TextMasterhart Information     ERPLY lets you send messages to your doctor, view your test results, renew your prescriptions, schedule appointments and more. To sign up, go to [email or web address] . Click on \"Log in\" on the left side of the screen, which will take you to the Welcome page. Then click on \"Sign up Now\" on the right side of the page.     You will be asked to enter the access code listed below, as well as some personal information. Please follow the directions to create your username and password.     Your access code is: [email or web address]  Expires: 2018  9:19 PM     Your access code will  in 90 days. If you need help or a new code, please call your [facility] clinic or [phone].        Care EveryWhere ID     This is your Care EveryWhere ID. This could be used by other organizations to access your [facility] medical records  [id number]        Equal Access to Services     BERE SCALES : Haddereje kayo Sonicole, waaxda luqadaha, qaybta kaalmada adehaether, kinga wood . So [facility] [phone].    ATENCIÓN: Si habla español, tiene a gonzales disposición servicios gratuitos de asistencia lingüística. Llame al [phone].    We comply with applicable federal civil rights laws and [place] laws. We do not discriminate on the basis of race, color, national origin, age, disability, sex, sexual orientation, or gender identity.            After Visit Summary       This is your record. Keep this with you and show to your community pharmacist(s) and doctor(s) at your next visit.                  "

## 2018-04-20 NOTE — LETTER
[facility] [address] [place] [address]  Main: [phone]  Dept: [phone]      April 20, 2018      Re: Gm Calderón      TO WHOM IT MAY CONCERN:    Gm Calderón was evaluated for acute injury. He will remain in a splint with use of his right arm as tolerated for 5-7 days.         Sincerely,      Garcia Bentley PA-C   4/20/2018   9:20 PM

## 2018-04-21 ASSESSMENT — ENCOUNTER SYMPTOMS
CARDIOVASCULAR NEGATIVE: 1
MYALGIAS: 1
PSYCHIATRIC NEGATIVE: 1
WOUND: 1
RESPIRATORY NEGATIVE: 1
ARTHRALGIAS: 1
CONSTITUTIONAL NEGATIVE: 1
NEUROLOGICAL NEGATIVE: 1
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
## 2018-04-21 NOTE — DISCHARGE INSTRUCTIONS
- Rest, ice, compression, elevation  - Rotate ibuprofen and tylenol every 3-6 hrs for 2-3 days  - ultram for pain >5/10.     * range of motion as tolerated after 3 or so days, may wear the splint for support for up to 1 week. Any pain after 7 days should be rechecked, possibly re-xrayed.

## 2018-04-22 ENCOUNTER — HOSPITAL ENCOUNTER (EMERGENCY)
Facility: OTHER | Age: 48
Discharge: HOME | End: 2018-04-22
Attending: EMERGENCY MEDICINE | Admitting: EMERGENCY MEDICINE
Payer: COMMERCIAL

## 2018-04-22 VITALS
DIASTOLIC BLOOD PRESSURE: 65 MMHG | OXYGEN SATURATION: 98 % | HEIGHT: 73 IN | HEART RATE: 65 BPM | WEIGHT: 160 LBS | SYSTOLIC BLOOD PRESSURE: 136 MMHG | TEMPERATURE: 98.1 F | BODY MASS INDEX: 21.2 KG/M2 | RESPIRATION RATE: 16 BRPM

## 2018-04-22 DIAGNOSIS — S57.81XD: ICD-10-CM

## 2018-04-22 PROCEDURE — 99282 EMERGENCY DEPT VISIT SF MDM: CPT | Performed by: EMERGENCY MEDICINE

## 2018-04-22 PROCEDURE — 99283 EMERGENCY DEPT VISIT LOW MDM: CPT | Mod: Z6 | Performed by: EMERGENCY MEDICINE

## 2018-04-22 RX ORDER — TRAMADOL HYDROCHLORIDE 50 MG/1
50 TABLET ORAL EVERY 6 HOURS PRN
Qty: 20 TABLET | Refills: 0 | Status: SHIPPED | OUTPATIENT
Start: 2018-04-22 | End: 2018-05-09

## 2018-04-22 ASSESSMENT — ENCOUNTER SYMPTOMS
NAUSEA: 0
FEVER: 0
DYSURIA: 0
CHEST TIGHTNESS: 0
ARTHRALGIAS: 1
AGITATION: 0
CHILLS: 0
LIGHT-HEADEDNESS: 0
VOMITING: 0
SHORTNESS OF BREATH: 0
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
## 2018-04-22 NOTE — ED AVS SNAPSHOT
[facility]    [address]    [place] [address]    Phone:  [phone]    Fax:  [phone]                                       Gm Caledrón   MRN: [id number]    Department:  [facility]   Date of Visit:  4/22/2018           After Visit Summary Signature Page     I have received my discharge instructions, and my questions have been answered. I have discussed any challenges I see with this plan with the nurse or doctor.    ..........................................................................................................................................  Patient/Patient Representative Signature      ..........................................................................................................................................  Patient Representative Print Name and Relationship to Patient    ..................................................               ................................................  Date                                            Time    ..........................................................................................................................................  Reviewed by Signature/Title    ...................................................              ..............................................  Date                                                            Time

## 2018-04-22 NOTE — ED AVS SNAPSHOT
[facility]    [address]    [place] [address]    Phone:  [phone]    Fax:  [phone]                                       Gm Calderón   MRN: [id number]    Department:  [facility]   Date of Visit:  4/22/2018           Patient Information     Date Of Birth          1970        Your diagnoses for this visit were:     Crush injury forearm, right, subsequent encounter        You were seen by Camden Garsia MD.      Discharge References/Attachments     CRUSH INJURY, HAND (ENGLISH)      24 Hour Appointment Hotline       To make an appointment at any [facility], call [phone] ([phone]). If you don't have a family doctor or clinic, we will help you find one. [place] clinics are conveniently located to serve the needs of you and your family.             Review of your medicines      CONTINUE these medicines which may have CHANGED, or have new prescriptions. If we are uncertain of the size of tablets/capsules you have at home, strength may be listed as something that might have changed.        Dose / Directions Last dose taken    * traMADol 50 MG tablet   Commonly known as:  ULTRAM   Dose:   mg   What changed:  Another medication with the same name was added. Make sure you understand how and when to take each.   Quantity:  6 tablet        Take 1-2 tablets ( mg) by mouth every 6 hours as needed for pain   Refills:  0        * traMADol 50 MG tablet   Commonly known as:  ULTRAM   Dose:  50 mg   What changed:  You were already taking a medication with the same name, and this prescription was added. Make sure you understand how and when to take each.   Quantity:  20 tablet        Take 1 tablet (50 mg) by mouth every 6 hours as needed for severe pain   Refills:  0        * Notice:  This list has 2 medication(s) that are the same as other medications prescribed for you. Read the directions carefully, and ask your doctor or  other care provider to review them with you.      Our records show that you are taking the medicines listed below. If these are incorrect, please call your family doctor or clinic.        Dose / Directions Last dose taken    gabapentin 300 MG capsule   Commonly known as:  NEURONTIN   Quantity:  90 capsule        TAKE 1 CAPSULE BY MOUTH THREE TIMES DAILY   Refills:  1        ibuprofen 800 MG tablet   Commonly known as:  ADVIL/MOTRIN   Dose:  800 mg   Quantity:  60 tablet        Take 1 tablet (800 mg) by mouth every 8 hours as needed for moderate pain   Refills:  0        ketorolac 10 MG tablet   Commonly known as:  TORADOL   Dose:  10 mg   Quantity:  10 tablet        Take 1 tablet (10 mg) by mouth every 6 hours as needed for moderate pain   Refills:  0                Information about OPIOIDS     PRESCRIPTION OPIOIDS: WHAT YOU NEED TO KNOW   You have a prescription for an opioid (narcotic) pain medicine. Opioids can cause addiction. If you have a history of chemical dependency of any type, you are at a higher risk of becoming addicted to opioids. Only take this medicine after all other options have been tried. Take it for as short a time and as few doses as possible.     Do not:    Drive. If you drive while taking these medicines, you could be arrested for driving under the influence (DUI).    Operate heavy machinery    Do any other dangerous activities while taking these medicines.     Drink any alcohol while taking these medicines.      Take with any other medicines that contain acetaminophen. Read all labels carefully. Look for the word  acetaminophen  or  Tylenol.  Ask your pharmacist if you have questions or are unsure.    Store your pills in a secure place, locked if possible. We will not replace any lost or stolen medicine. If you don t finish your medicine, please throw away (dispose) as directed by your pharmacist. The [place] [employer]ution [employer] has more information about safe disposal:  "https://www.pca.[facility].[place].us/living-green/managing-unwanted-medications    All opioids tend to cause constipation. Drink plenty of water and eat foods that have a lot of fiber, such as fruits, vegetables, prune juice, apple juice and high-fiber cereal. Take a laxative (Miralax, milk of magnesia, Colace, Senna) if you don t move your bowels at least every other day.         Prescriptions were sent or printed at these locations (1 Prescription)                   Other Prescriptions                Printed at Department/Unit printer (1 of 1)         traMADol (ULTRAM) 50 MG tablet                Orders Needing Specimen Collection     None      Pending Results     No orders found from 4/20/2018 to 4/23/2018.            Pending Culture Results     No orders found from 4/20/2018 to 4/23/2018.            Pending Results Instructions     If you had any lab results that were not finalized at the time of your Discharge, you can call the ED Lab Result RN at [phone]. You will be contacted by this team for any positive Lab results or changes in treatment. The nurses are available 7 days a week from 10A to 6:30P.  You can leave a message 24 hours per day and they will return your call.        Thank you for choosing [facility]       Thank you for choosing [facility] for your care. Our goal is always to provide you with excellent care. Hearing back from our patients is one way we can continue to improve our services. Please take a few minutes to complete the written survey that you may receive in the mail after you visit with us. Thank you!        [employer] Information     [facility] lets you send messages to your doctor, view your test results, renew your prescriptions, schedule appointments and more. To sign up, go to [email or web address] . Click on \"Log in\" on the left side of the screen, which will take you to the Welcome page. Then click on \"Sign up Now\" on the right side of the page.     You will be asked to enter the access " code listed below, as well as some personal information. Please follow the directions to create your username and password.     Your access code is: [email or web address]  Expires: 2018  9:19 PM     Your access code will  in 90 days. If you need help or a new code, please call your [place] clinic or [phone].        Care EveryWhere ID     This is your Care EveryWhere ID. This could be used by other organizations to access your [place] medical records  [id number]        Equal Access to Services     [facility]: Lisa Jiang, kd horton, anatoliy lucasaljuan carlos patel, kinga wood . [facility] [phone].    ATENCIÓN: Si habla español, tiene a gonzales disposición servicios gratuitos de asistencia lingüística. Llame al [phone].    We comply with applicable federal civil rights laws and [place] laws. We do not discriminate on the basis of race, color, national origin, age, disability, sex, sexual orientation, or gender identity.            After Visit Summary       This is your record. Keep this with you and show to your community pharmacist(s) and doctor(s) at your next visit.

## 2018-04-22 NOTE — ED PROVIDER NOTES
History   No chief complaint on file.    Patient is a 48 year old male presenting with arm injury. The history is provided by the patient.   Arm Injury   Associated symptoms: no fever      Gm Calderón is a 48 year old male who was seen in the emergency Department in [place] 2 days ago and diagnosed with crush injury of his right forearm. I am able to review the note from that visit as well as to view the x-rays. There is no fracture. He was placed in a splint and given some tramadol. He ran out of his tramadol and he is here with pain. The pain is worst at 2 sites. He points to his mid dorsal hand over his distal third metacarpal. He also points to his distal forearm over the distal dorsal radius. He does have full range of motion. He does not have any numbness or tingling or weakness in the fingers or the hand. He has limited range of motion secondary to the pain. He has good color in all digits and is neurovascularly intact.    Problem List:    Patient Active Problem List    Diagnosis Date Noted     Anxiety 10/19/2016     Priority: Medium     Major depressive disorder, recurrent episode, moderate (H) 10/19/2016     Priority: Medium     Facet degeneration of lumbar region 10/19/2016     Priority: Medium     Chronic left-sided low back pain with left-sided sciatica 05/25/2016     Priority: Medium     Tobacco dependence 05/25/2016     Priority: Medium     ACP (advance care planning) 05/11/2016     Priority: Medium     Advance Care Planning 5/11/2016: ACP Review of Chart / Resources Provided:  Reviewed chart for advance care plan.  Gm Calderón has no plan or code status on file. Discussed available resources and provided with information. Confirmed code status reflects current choices pending further ACP discussions.  Confirmed/documented legally designated decision makers.  Added by Mirella Kiser             Suicide attempt 07/08/2015     Priority: Medium        Past Medical History:    No past medical  "history on file.    Past Surgical History:    No past surgical history on file.    Family History:    No family history on file.    Social History:  Marital Status:  Single [1]  Social History   Substance Use Topics     Smoking status: Current Every Day Smoker     Packs/day: 0.50     Smokeless tobacco: Never Used      Comment: Quit smoking: pretty close     Alcohol use Yes      Comment: Alcoholic Drinks/day: social        Medications:      gabapentin (NEURONTIN) 300 MG capsule   ibuprofen (ADVIL/MOTRIN) 800 MG tablet   ketorolac (TORADOL) 10 MG tablet   traMADol (ULTRAM) 50 MG tablet   traMADol (ULTRAM) 50 MG tablet         Review of Systems   Constitutional: Negative for chills and fever.   HENT: Negative for congestion.    Eyes: Negative for visual disturbance.   Respiratory: Negative for chest tightness and shortness of breath.    Cardiovascular: Negative for chest pain.   Gastrointestinal: Negative for nausea and vomiting.   Genitourinary: Negative for dysuria.   Musculoskeletal: Positive for arthralgias.   Skin: Negative for rash.   Neurological: Negative for light-headedness.   Psychiatric/Behavioral: Negative for agitation.       Physical Exam   BP: 146/82  Pulse: 108  Temp: 99  F (37.2  C)  Resp: 18  Height: 185.4 cm (6' 1\")  Weight: 72.6 kg (160 lb)  SpO2: 98 %      Physical Exam   Constitutional: He is oriented to person, place, and time. He appears well-developed and well-nourished. No distress.   HENT:   Head: Normocephalic and atraumatic.   Eyes: Conjunctivae are normal.   Neck: Neck supple.   Cardiovascular: Normal rate, regular rhythm and normal heart sounds.    Pulmonary/Chest: Effort normal and breath sounds normal. No respiratory distress.   Abdominal: Soft. Bowel sounds are normal.   Musculoskeletal:   He does not have any numbness or tingling or weakness in the fingers or the hand. He has limited range of motion secondary to the pain. He has good color in all digits and is neurovascularly " intact.  Palpation of the forearm reveals tenderness over the distal forearm. There is no significant swelling. Do not feel any dense woody type musculature concerning for compartment syndrome. With full passive range of motion there is no crepitus anywhere.   Neurological: He is alert and oriented to person, place, and time.   Skin: Skin is warm and dry. He is not diaphoretic.   Psychiatric: He has a normal mood and affect. His behavior is normal.   Nursing note and vitals reviewed.      ED Course     ED Course     Procedures           No results found for this or any previous visit (from the past 24 hour(s)).    Medications - No data to display    Assessments & Plan (with Medical Decision Making)     I have reviewed the nursing notes.    I have reviewed the findings, diagnosis, plan and need for follow up with the patient.  I did review the radiologist's interpretation as well as viewed the x-rays for myself. No obvious fracture. I do not believe he has compartment syndrome. He should continue to wear the brace, use ice elevation and ibuprofen. I did refill for 20 more tramadol. Follow up with orthopedics if not improving.    New Prescriptions    TRAMADOL (ULTRAM) 50 MG TABLET    Take 1 tablet (50 mg) by mouth every 6 hours as needed for severe pain       Final diagnoses:   Crush injury forearm, right, subsequent encounter       4/22/2018   [facility]     Camden Garsia MD  04/22/18 [id number]

## 2018-05-09 ENCOUNTER — HOSPITAL ENCOUNTER (EMERGENCY)
Facility: OTHER | Age: 48
Discharge: HOME | End: 2018-05-09
Attending: FAMILY MEDICINE | Admitting: FAMILY MEDICINE
Payer: COMMERCIAL

## 2018-05-09 VITALS
OXYGEN SATURATION: 96 % | SYSTOLIC BLOOD PRESSURE: 122 MMHG | TEMPERATURE: 98 F | DIASTOLIC BLOOD PRESSURE: 77 MMHG | BODY MASS INDEX: 23.19 KG/M2 | WEIGHT: 175 LBS | RESPIRATION RATE: 20 BRPM | HEIGHT: 73 IN | HEART RATE: 99 BPM

## 2018-05-09 DIAGNOSIS — S10.91XA ABRASION OF NECK, INITIAL ENCOUNTER: ICD-10-CM

## 2018-05-09 PROCEDURE — 99283 EMERGENCY DEPT VISIT LOW MDM: CPT | Mod: Z6 | Performed by: FAMILY MEDICINE

## 2018-05-09 PROCEDURE — 99283 EMERGENCY DEPT VISIT LOW MDM: CPT | Performed by: FAMILY MEDICINE

## 2018-05-09 NOTE — ED AVS SNAPSHOT
[facility]    [address]    [place] [address]    Phone:  [phone]    Fax:  [phone]                                       Gm Calderón   MRN: [id number]    Department:  [facility]   Date of Visit:  5/9/2018           After Visit Summary Signature Page     I have received my discharge instructions, and my questions have been answered. I have discussed any challenges I see with this plan with the nurse or doctor.    ..........................................................................................................................................  Patient/Patient Representative Signature      ..........................................................................................................................................  Patient Representative Print Name and Relationship to Patient    ..................................................               ................................................  Date                                            Time    ..........................................................................................................................................  Reviewed by Signature/Title    ...................................................              ..............................................  Date                                                            Time

## 2018-05-09 NOTE — ED PROVIDER NOTES
History   No chief complaint on file.    HPI  Gm Calderón is a 48 year old male who yesterday apparently tripped and while falling forward his neck caught on a tow strap which is uncomfortable at the time and is continued to be a bother to him and hence he presents to emergency room to have this evaluated. Patient states she's had some discomfort while swallowing but has not had any respiratory difficulties and is denying any cough fever chills or shortness of breath. He demonstrated swallowing here in the emergency room with a cup of water. His medical doctor is in [place] and he is over visiting his brother here in [place]    Problem List:    Patient Active Problem List    Diagnosis Date Noted     Anxiety 10/19/2016     Priority: Medium     Major depressive disorder, recurrent episode, moderate (H) 10/19/2016     Priority: Medium     Facet degeneration of lumbar region 10/19/2016     Priority: Medium     Chronic left-sided low back pain with left-sided sciatica 05/25/2016     Priority: Medium     Tobacco dependence 05/25/2016     Priority: Medium     ACP (advance care planning) 05/11/2016     Priority: Medium     Advance Care Planning 5/11/2016: ACP Review of Chart / Resources Provided:  Reviewed chart for advance care plan.  Gm Calderón has no plan or code status on file. Discussed available resources and provided with information. Confirmed code status reflects current choices pending further ACP discussions.  Confirmed/documented legally designated decision makers.  Added by Mirella Kiser             Suicide attempt 07/08/2015     Priority: Medium        Past Medical History:    No past medical history on file.    Past Surgical History:    No past surgical history on file.    Family History:    No family history on file.    Social History:  Marital Status:  Single [1]  Social History   Substance Use Topics     Smoking status: Current Every Day Smoker     Packs/day: 0.50     Smokeless  "tobacco: Never Used      Comment: Quit smoking: pretty close     Alcohol use Yes      Comment: Alcoholic Drinks/day: social        Medications:      gabapentin (NEURONTIN) 300 MG capsule         Review of Systems unremarkable or as as noted in the history of present illness    Physical Exam   BP: 122/77  Pulse: 99  Temp: 98  F (36.7  C)  Resp: 20  Height: 185.4 cm (6' 1\")  Weight: 79.4 kg (175 lb)  SpO2: 96 %      Physical Exam alert and cooperative patient not appearing to be in any acute distress with satisfactory vital signs  as no signs of any pathology with regards to his ear nose and throat  HEENT: Negative, normal throat,  neck mild appearing abrasion anterior neck region at the level of the thyroid cartilage weather is no significant deformity or marked tenderness and no adenopathy  chest good breath sounds throughout no wheezes or rales heard  cardiac exam is entirely normal heart rate was in the 80s during my exam and he had no signs of any distress    ED Course     ED Course     Procedures               Critical Care time:  None                 No results found for this or any previous visit (from the past 24 hour(s)).    Medications - No data to display    Assessments & Plan (with Medical Decision Making)     I have reviewed the nursing notes.  I have reviewed the findings, diagnosis, plan and need for follow up with the patient. It would appear this patient has had a mild soft tissue injury to the anterior neck region which should resolve over a relatively short period of time. If he continues to be symptomatic and recommended follow-up with his primary care provider. If it any time he is unable to swallow or has any difficulty breathing to be rechecked immediately       New Prescriptions    No medications on file       Final diagnoses:   None       5/9/2018   [facility]     Camden Gonzalez MD  05/09/18 [id number]    "

## 2018-05-09 NOTE — ED TRIAGE NOTES
Yesterday pt tripped and when he was moving forward his neck caught on a tow strap. Pt is having pain and swelling on left side. Pt reports having difficulty swallowing after the injury.    Minal Matias RN on 5/9/2018 at 5:57 PM

## 2018-05-09 NOTE — ED AVS SNAPSHOT
[facility]    [address]    [place] [address]    Phone:  [phone]    Fax:  [phone]                                       Gm Calderón   MRN: [id number]    Department:  [facility]   Date of Visit:  5/9/2018           Patient Information     Date Of Birth          1970        Your diagnoses for this visit were:     Abrasion of neck, initial encounter        You were seen by Camden Gonzalez MD.      Follow-up Information     Follow up with [facility].    Specialty:  EMERGENCY MEDICINE    Why:  As needed, If symptoms worsen    Contact information:    [address]  [place] [address] [phone]        Follow up with Nicole Lance NP In 1 week.    Specialty:  Family Practice    Why:  As needed    Contact information:    [address] [address]  [phone]          Discharge Instructions       Recommend drinking cold water when necessary, and use of Tylenol or ibuprofen liquid form as needed and dosing as directed on medication bottle  recheck with your primary care provider if not improved in a week or return here immediately if becomes acutely worse, unable to swallow any fluids or increased severe pain or any difficulty breathing    24 Hour Appointment Hotline       To make an appointment at any [facility] clinic, call [phone] ([phone]). If you don't have a family doctor or clinic, we will help you find one. [facility] clinics are conveniently located to serve the needs of you and your family.             Review of your medicines      Our records show that you are taking the medicines listed below. If these are incorrect, please call your family doctor or clinic.        Dose / Directions Last dose taken    gabapentin 300 MG capsule   Commonly known as:  NEURONTIN   Quantity:  90 capsule        TAKE 1 CAPSULE BY MOUTH THREE TIMES DAILY  "  Refills:  1                Orders Needing Specimen Collection     None      Pending Results     No orders found from 2018 to 5/10/2018.            Pending Culture Results     No orders found from 2018 to 5/10/2018.            Pending Results Instructions     If you had any lab results that were not finalized at the time of your Discharge, you can call the ED Lab Result RN at [phone]. You will be contacted by this team for any positive Lab results or changes in treatment. The nurses are available 7 days a week from 10A to 6:30P.  You can leave a message 24 hours per day and they will return your call.        Thank you for choosing [facility]       Thank you for choosing [facility] for your care. Our goal is always to provide you with excellent care. Hearing back from our patients is one way we can continue to improve our services. Please take a few minutes to complete the written survey that you may receive in the mail after you visit with us. Thank you!        [employer] Information     [facility] lets you send messages to your doctor, view your test results, renew your prescriptions, schedule appointments and more. To sign up, go to [email or web address] . Click on \"Log in\" on the left side of the screen, which will take you to the Welcome page. Then click on \"Sign up Now\" on the right side of the page.     You will be asked to enter the access code listed below, as well as some personal information. Please follow the directions to create your username and password.     Your access code is: [email or web address]  Expires: 2018  9:19 PM     Your access code will  in 90 days. If you need help or a new code, please call your [facility] clinic or [phone].        Care EveryWhere ID     This is your Care EveryWhere ID. This could be used by other organizations to access your [facility] medical records  [id number]        Equal Access to Services     CHRISTIANO ALFARO: kd Machuca " anatoliy horton waxay idiin hayaan adeeg kharash la'aan ah. So [facility] [phone].    ATENCIÓN: Si habla meggan, tiene a gonzales disposición servicios gratuitos de asistencia lingüística. Llame al [phone].    We comply with applicable federal civil rights laws and [place] laws. We do not discriminate on the basis of race, color, national origin, age, disability, sex, sexual orientation, or gender identity.            After Visit Summary       This is your record. Keep this with you and show to your community pharmacist(s) and doctor(s) at your next visit.

## 2018-05-09 NOTE — DISCHARGE INSTRUCTIONS
Recommend drinking cold water when necessary, and use of Tylenol or ibuprofen liquid form as needed and dosing as directed on medication bottle  recheck with your primary care provider if not improved in a week or return here immediately if becomes acutely worse, unable to swallow any fluids or increased severe pain or any difficulty breathing

## 2018-05-09 NOTE — ED TRIAGE NOTES
COLUMBIA-SUICIDE SEVERITY RATING SCALE   Screen with Triage Points for Emergency Department      Ask questions that are bolded and underlined.   Past  month   Ask Questions 1 and 2 YES NO   1)  Have you wished you were dead or wished you could go to sleep and not wake up?   no   2)  Have you actually had any thoughts of killing yourself?   no   If YES to 2, ask questions 3, 4, 5, and 6.  If NO to 2, go directly to question 6.   3)  Have you been thinking about how you might do this?   E.g.  I thought about taking an overdose but I never made a specific plan as to when where or how I would actually do it .and I would never go through with it.       4)  Have you had these thoughts and had some intention of acting on them?   As opposed to  I have the thoughts but I definitely will not do anything about them.       5)  Have you started to work out or worked out the details of how to kill yourself? Do you intend to carry out this plan?      6)  Have you ever done anything, started to do anything, or prepared to do anything to end your life?  Examples: Collected pills, obtained a gun, gave away valuables, wrote a will or suicide note, took out pills but didn t swallow any, held a gun but changed your mind or it was grabbed from your hand, went to the roof but didn t jump; or actually took pills, tried to shoot yourself, cut yourself, tried to hang yourself, etc.    If YES, ask: Was this within the past three months?  Lifetime     no    Past 3 Months        Item 1:  Behavioral Health Referral at Discharge  Item 2:  Behavioral Health Referral at Discharge   Item 3:  Behavioral Health Consult (Psychiatric Nurse/) and consider Patient Safety Precautions  Item 4:  Immediate Notification of Physician and/or Behavioral Health and Patient Safety Precautions   Item 5:  Immediate Notification of Physician and/or Behavioral Health and Patient Safety Precautions  Item 6:  Over 3 months ago: Behavioral Health Consult  (Psychiatric Nurse/) and consider Patient Safety Precautions  OR  Item 6:  3 months ago or less: Immediate Notification of Physician and/or Behavioral Health and Patient Safety Precautions

## 2018-05-13 ENCOUNTER — TRANSFERRED RECORDS (OUTPATIENT)
Dept: HEALTH INFORMATION MANAGEMENT | Facility: CLINIC | Age: 48
End: 2018-05-13

## 2018-05-31 DIAGNOSIS — G57.00 LESION OF SCIATIC NERVE, UNSPECIFIED LATERALITY: ICD-10-CM

## 2018-05-31 RX ORDER — GABAPENTIN 300 MG/1
CAPSULE ORAL
Qty: 90 CAPSULE | Refills: 0 | Status: SHIPPED | OUTPATIENT
Start: 2018-05-31 | End: 2018-07-07

## 2018-05-31 NOTE — TELEPHONE ENCOUNTER
gabapentin      Last Written Prescription Date:  3/21/18  Last Fill Quantity: 90,   # refills: 1  Last Office Visit: 2/15/18  Future Office visit:       Routing refill request to provider for review/approval because:  Drug not on the [facility] or [facility] refill protocol or controlled substance

## 2018-07-07 DIAGNOSIS — G57.00 LESION OF SCIATIC NERVE, UNSPECIFIED LATERALITY: ICD-10-CM

## 2018-07-09 RX ORDER — GABAPENTIN 300 MG/1
CAPSULE ORAL
Qty: 90 CAPSULE | Refills: 0 | Status: SHIPPED | OUTPATIENT
Start: 2018-07-09 | End: 2018-08-06

## 2018-07-09 NOTE — TELEPHONE ENCOUNTER
gabapentin      Last Written Prescription Date:  5/31/18  Last Fill Quantity: 90,   # refills: 0  Last Office Visit: 3/22/18  Future Office visit: none

## 2018-08-06 DIAGNOSIS — G57.00 LESION OF SCIATIC NERVE, UNSPECIFIED LATERALITY: ICD-10-CM

## 2018-08-06 NOTE — TELEPHONE ENCOUNTER
GABAPENTIN 300MG CAPSULE    Last Written Prescription Date:  7/9/18  Last Fill Quantity: 90,   # refills: 0  Last Office Visit: 2/15/18  Future Office visit:

## 2018-08-07 RX ORDER — GABAPENTIN 300 MG/1
CAPSULE ORAL
Qty: 90 CAPSULE | Refills: 0 | Status: SHIPPED | OUTPATIENT
Start: 2018-08-07 | End: 2018-09-05

## 2018-09-05 DIAGNOSIS — G57.00 LESION OF SCIATIC NERVE, UNSPECIFIED LATERALITY: ICD-10-CM

## 2018-09-05 NOTE — TELEPHONE ENCOUNTER
gabapentin      Last Written Prescription Date:  8/7/18  Last Fill Quantity: 90,   # refills: 0  Last Office Visit: 2/15/18  Future Office visit: none

## 2018-09-06 RX ORDER — GABAPENTIN 300 MG/1
CAPSULE ORAL
Qty: 90 CAPSULE | Refills: 0 | Status: SHIPPED | OUTPATIENT
Start: 2018-09-06 | End: 2018-10-16

## 2018-10-16 DIAGNOSIS — G57.00 LESION OF SCIATIC NERVE, UNSPECIFIED LATERALITY: ICD-10-CM

## 2018-10-17 RX ORDER — GABAPENTIN 300 MG/1
CAPSULE ORAL
Qty: 90 CAPSULE | Refills: 0 | Status: SHIPPED | OUTPATIENT
Start: 2018-10-17 | End: 2018-11-08

## 2018-10-17 NOTE — TELEPHONE ENCOUNTER
Gabapentin  Last Written Prescription Date:  9/6/18  Last Fill Qty:  90, # Refills:  0  Last Office Visit:  2/15/18

## 2018-11-08 DIAGNOSIS — G57.00 LESION OF SCIATIC NERVE, UNSPECIFIED LATERALITY: ICD-10-CM

## 2018-11-08 NOTE — TELEPHONE ENCOUNTER
gabapentin      Last Written Prescription Date:  10/17/18  Last Fill Quantity: 90,   # refills: 0  Last Office Visit: 2/15/18  Future Office visit:       Routing refill request to provider for review/approval because:  Drug not on the [facility] or [facility] refill protocol or controlled substance

## 2018-11-09 RX ORDER — GABAPENTIN 300 MG/1
CAPSULE ORAL
Qty: 90 CAPSULE | Refills: 0 | Status: SHIPPED | OUTPATIENT
Start: 2018-11-09 | End: 2018-12-18

## 2018-11-19 DIAGNOSIS — G57.00 LESION OF SCIATIC NERVE, UNSPECIFIED LATERALITY: ICD-10-CM

## 2018-11-20 RX ORDER — GABAPENTIN 300 MG/1
CAPSULE ORAL
Qty: 90 CAPSULE | Refills: 0 | OUTPATIENT
Start: 2018-11-20

## 2018-12-10 DIAGNOSIS — G57.00 LESION OF SCIATIC NERVE, UNSPECIFIED LATERALITY: ICD-10-CM

## 2018-12-10 NOTE — TELEPHONE ENCOUNTER
gabapentin (NEURONTIN) 300 MG capsule  Last Written Prescription Date:  11/9/18  Last Fill Quantity: 90,   # refills: 0  Last Office Visit: 2/15/18  Future Office visit:       Routing refill request to provider for review/approval because:  Drug not on the [facility], [facility] or [facility] refill protocol or controlled substance

## 2018-12-18 RX ORDER — GABAPENTIN 300 MG/1
CAPSULE ORAL
Qty: 90 CAPSULE | Refills: 0 | Status: SHIPPED | OUTPATIENT
Start: 2018-12-18 | End: 2019-01-08

## 2019-01-08 DIAGNOSIS — G57.00 LESION OF SCIATIC NERVE, UNSPECIFIED LATERALITY: ICD-10-CM

## 2019-01-08 NOTE — TELEPHONE ENCOUNTER
: gabapentin (NEURONTIN) 300 MG capsule    Last refill date 12/18/18      Last office visit 2/15/18

## 2019-01-09 RX ORDER — GABAPENTIN 300 MG/1
CAPSULE ORAL
Qty: 90 CAPSULE | Refills: 0 | Status: SHIPPED | OUTPATIENT
Start: 2019-01-09 | End: 2019-03-06

## 2019-03-06 DIAGNOSIS — G57.00 LESION OF SCIATIC NERVE, UNSPECIFIED LATERALITY: ICD-10-CM

## 2019-03-06 RX ORDER — GABAPENTIN 300 MG/1
CAPSULE ORAL
Qty: 90 CAPSULE | Refills: 0 | Status: SHIPPED | OUTPATIENT
Start: 2019-03-06

## 2019-03-06 NOTE — TELEPHONE ENCOUNTER
gabapentin (NEURONTIN) 300 MG capsule  Last Written Prescription Date:  01/09/2019  Last Fill Quantity: 90,   # refills: 0  Last Office Visit: 02/15/2018  Future Office visit:       Routing refill request to provider for review/approval because: